# Patient Record
Sex: FEMALE | Race: WHITE | NOT HISPANIC OR LATINO | Employment: OTHER | URBAN - NONMETROPOLITAN AREA
[De-identification: names, ages, dates, MRNs, and addresses within clinical notes are randomized per-mention and may not be internally consistent; named-entity substitution may affect disease eponyms.]

---

## 2018-06-05 ENCOUNTER — OFFICE VISIT (OUTPATIENT)
Dept: GASTROENTEROLOGY | Facility: CLINIC | Age: 58
End: 2018-06-05

## 2018-06-05 VITALS
WEIGHT: 206.2 LBS | DIASTOLIC BLOOD PRESSURE: 53 MMHG | HEIGHT: 66 IN | BODY MASS INDEX: 33.14 KG/M2 | SYSTOLIC BLOOD PRESSURE: 102 MMHG | OXYGEN SATURATION: 91 % | HEART RATE: 66 BPM

## 2018-06-05 DIAGNOSIS — R11.0 NAUSEA: ICD-10-CM

## 2018-06-05 DIAGNOSIS — K63.5 POLYP OF COLON, UNSPECIFIED PART OF COLON, UNSPECIFIED TYPE: ICD-10-CM

## 2018-06-05 DIAGNOSIS — R19.7 DIARRHEA, UNSPECIFIED TYPE: ICD-10-CM

## 2018-06-05 DIAGNOSIS — R10.84 GENERALIZED ABDOMINAL PAIN: Primary | ICD-10-CM

## 2018-06-05 DIAGNOSIS — Z12.11 COLON CANCER SCREENING: ICD-10-CM

## 2018-06-05 PROCEDURE — 99244 OFF/OP CNSLTJ NEW/EST MOD 40: CPT | Performed by: PHYSICIAN ASSISTANT

## 2018-06-05 NOTE — PROGRESS NOTES
Chief Complaint   Patient presents with   • Diarrhea   • Abdominal Pain       Sloane Gustafson is a 58 y.o. female who presents to the office today for evaluation of Diarrhea and Abdominal Pain  .    HPI  58-year-old white female presents with long (several years) history of diarrhea.  Her diarrhea has gradually worsened.  She reports having up to 10 BMs daily.  Denies overt rectal bleeding.  She reports associated generalized abdominal bloating and cramping .  She takes OTC Imodium as needed, but she says that this medication is not very helpful.  She reports frequent nausea and takes Zofran as needed.  Her gallbladder has been removed.  She has history of colonic polyps.  Last colonoscopy was performed more than 5 years ago.  An aunt had colon cancer.        Review of Systems   Constitutional: Negative for appetite change and unexpected weight change.   HENT: Positive for congestion, sore throat and trouble swallowing.    Eyes: Positive for visual disturbance.   Respiratory: Positive for cough, shortness of breath and wheezing.    Cardiovascular: Positive for chest pain and leg swelling.   Gastrointestinal: Positive for abdominal distention, abdominal pain, diarrhea, nausea and rectal pain. Negative for anal bleeding, blood in stool, constipation and vomiting.   Endocrine: Positive for cold intolerance. Negative for heat intolerance.   Genitourinary: Negative.    Musculoskeletal: Positive for arthralgias, back pain and myalgias.   Skin: Negative.    Allergic/Immunologic: Positive for environmental allergies. Negative for food allergies.   Neurological: Positive for dizziness and light-headedness.   Hematological: Bruises/bleeds easily.   Psychiatric/Behavioral: Positive for sleep disturbance. The patient is nervous/anxious.        ACTIVE PROBLEMS:   Specialty Problems     None          PAST MEDICAL HISTORY:  Past Medical History:   Diagnosis Date   • Chronic kidney disease    • Diabetes mellitus    • Gout    • Heart  "disease    • History of uterine cancer    • Hypertension    • Vision impairment        SURGICAL HISTORY:  Past Surgical History:   Procedure Laterality Date   • ARTERIOVENOUS FISTULA     • CARPAL TUNNEL RELEASE     • CATARACT EXTRACTION     • CHOLECYSTECTOMY     • COLONOSCOPY     • EYE SURGERY     • HYSTERECTOMY         FAMILY HISTORY:  Family History   Problem Relation Age of Onset   • Diabetes Other    • Heart disease Other    • Cancer Other        SOCIAL HISTORY:  Social History   Substance Use Topics   • Smoking status: Never Smoker   • Smokeless tobacco: Never Used   • Alcohol use No       CURRENT MEDICATION:    Current Outpatient Prescriptions:   •  polyethylene glycol (GoLYTELY) 236 g solution, Starting at 6 pm on day prior to procedure, drink 8 ounces every 15 minutes until all gone or stools are clear. May add flavor packet., Disp: 4000 mL, Rfl: 0    ALLERGIES:  Patient has no known allergies.    VISIT VITALS:  /53   Pulse 66   Ht 167.6 cm (66\")   Wt 93.5 kg (206 lb 3.2 oz)   SpO2 91%   BMI 33.28 kg/m²   Physical Exam   Constitutional: She is oriented to person, place, and time. She appears well-developed and well-nourished.   HENT:   Head: Normocephalic and atraumatic.   Eyes: Conjunctivae and EOM are normal. Pupils are equal, round, and reactive to light.   Neck: Normal range of motion. Neck supple.   Cardiovascular: Normal rate, regular rhythm and normal heart sounds.    Pulmonary/Chest: Effort normal and breath sounds normal.   Abdominal: Soft. Bowel sounds are normal.   Musculoskeletal: Normal range of motion.   Neurological: She is alert and oriented to person, place, and time. She has normal reflexes.   Skin: Skin is warm and dry.   Psychiatric: She has a normal mood and affect. Her behavior is normal.   Nursing note and vitals reviewed.      Assessment/Plan      Diagnosis Plan   1. Generalized abdominal pain  Case Request   2. Nausea  Case Request   3. Diarrhea, unspecified type  Case " Request   4. Polyp of colon, unspecified part of colon, unspecified type  Case Request   5. Colon cancer screening  Case Request     REC  Of recommended that she undergo both the EGD and screening/surveillance colonoscopy for further evaluation.  Procedures, benefits, risks and alternatives were explained to the patient.  I recommended no change in her medical treatment today, pending completion of her workup.    Return if symptoms worsen or fail to improve.         Patient's Body mass index is 33.28 kg/m². BMI is above normal parameters. Recommendations include: educational material.  Weight reduction was advised.      Shubham Olson III, MD

## 2018-06-06 PROBLEM — R11.0 NAUSEA: Status: ACTIVE | Noted: 2018-06-06

## 2018-06-06 PROBLEM — R10.84 GENERALIZED ABDOMINAL PAIN: Status: ACTIVE | Noted: 2018-06-06

## 2018-06-06 PROBLEM — Z12.11 COLON CANCER SCREENING: Status: ACTIVE | Noted: 2018-06-06

## 2018-06-06 PROBLEM — K63.5 POLYP OF COLON: Status: ACTIVE | Noted: 2018-06-06

## 2018-06-06 PROBLEM — R19.7 DIARRHEA: Status: ACTIVE | Noted: 2018-06-06

## 2018-06-06 RX ORDER — CALCIUM ACETATE 667 MG/1
CAPSULE ORAL
COMMUNITY
Start: 2018-04-21 | End: 2018-08-30

## 2018-06-06 RX ORDER — METOLAZONE 5 MG/1
TABLET ORAL
COMMUNITY
Start: 2018-04-21

## 2018-06-06 RX ORDER — INSULIN GLARGINE 100 [IU]/ML
INJECTION, SOLUTION SUBCUTANEOUS
COMMUNITY

## 2018-06-06 RX ORDER — ERGOCALCIFEROL 1.25 MG/1
CAPSULE ORAL
COMMUNITY
Start: 2018-05-21

## 2018-06-06 RX ORDER — MONTELUKAST SODIUM 10 MG/1
TABLET ORAL
COMMUNITY
Start: 2018-03-12

## 2018-06-06 RX ORDER — FUROSEMIDE 40 MG/1
TABLET ORAL
COMMUNITY
Start: 2018-03-12

## 2018-06-06 RX ORDER — METOPROLOL SUCCINATE 25 MG/1
TABLET, EXTENDED RELEASE ORAL
COMMUNITY
Start: 2018-03-12

## 2018-06-06 RX ORDER — ISOSORBIDE MONONITRATE 30 MG/1
TABLET, EXTENDED RELEASE ORAL
COMMUNITY
Start: 2018-04-21

## 2018-06-06 RX ORDER — INSULIN ASPART 100 [IU]/ML
INJECTION, SOLUTION INTRAVENOUS; SUBCUTANEOUS
COMMUNITY
Start: 2018-05-30

## 2018-06-06 RX ORDER — ALLOPURINOL 100 MG/1
TABLET ORAL
COMMUNITY
Start: 2018-04-21

## 2018-06-06 RX ORDER — CALCITRIOL 0.25 UG/1
CAPSULE, LIQUID FILLED ORAL
COMMUNITY
Start: 2018-03-12

## 2018-06-06 RX ORDER — ASPIRIN 81 MG/1
81 TABLET ORAL DAILY
COMMUNITY

## 2018-06-06 RX ORDER — SERTRALINE HYDROCHLORIDE 100 MG/1
TABLET, FILM COATED ORAL
COMMUNITY
Start: 2018-03-12

## 2018-06-06 RX ORDER — NIFEDIPINE 30 MG/1
TABLET, EXTENDED RELEASE ORAL
COMMUNITY
Start: 2018-03-12

## 2018-06-06 RX ORDER — ROSUVASTATIN CALCIUM 20 MG/1
TABLET, COATED ORAL
COMMUNITY
Start: 2018-03-12

## 2018-06-06 RX ORDER — FENOFIBRATE 48 MG/1
TABLET, COATED ORAL
COMMUNITY
Start: 2018-04-21

## 2018-06-27 ENCOUNTER — PREP FOR SURGERY (OUTPATIENT)
Dept: OTHER | Facility: HOSPITAL | Age: 58
End: 2018-06-27

## 2018-07-03 ENCOUNTER — ANESTHESIA EVENT (OUTPATIENT)
Dept: PERIOP | Facility: HOSPITAL | Age: 58
End: 2018-07-03

## 2018-07-03 ENCOUNTER — ANESTHESIA (OUTPATIENT)
Dept: PERIOP | Facility: HOSPITAL | Age: 58
End: 2018-07-03

## 2018-07-03 ENCOUNTER — HOSPITAL ENCOUNTER (OUTPATIENT)
Facility: HOSPITAL | Age: 58
Setting detail: HOSPITAL OUTPATIENT SURGERY
Discharge: HOME | End: 2018-07-03
Attending: SURGERY | Admitting: NURSE ANESTHETIST, CERTIFIED REGISTERED

## 2018-07-03 VITALS
WEIGHT: 204 LBS | HEIGHT: 66 IN | TEMPERATURE: 97.6 F | RESPIRATION RATE: 20 BRPM | HEART RATE: 62 BPM | BODY MASS INDEX: 32.78 KG/M2 | DIASTOLIC BLOOD PRESSURE: 75 MMHG | OXYGEN SATURATION: 99 % | SYSTOLIC BLOOD PRESSURE: 168 MMHG

## 2018-07-03 DIAGNOSIS — K63.5 COLON POLYP: ICD-10-CM

## 2018-07-03 DIAGNOSIS — K29.70 GASTRITIS: ICD-10-CM

## 2018-07-03 LAB — GLUCOSE BLDC GLUCOMTR-MCNC: 88 MG/DL (ref 70–130)

## 2018-07-03 PROCEDURE — 82962 GLUCOSE BLOOD TEST: CPT

## 2018-07-03 PROCEDURE — 45385 COLONOSCOPY W/LESION REMOVAL: CPT | Performed by: SURGERY

## 2018-07-03 PROCEDURE — 25010000002 FENTANYL CITRATE (PF) 100 MCG/2ML SOLUTION: Performed by: NURSE ANESTHETIST, CERTIFIED REGISTERED

## 2018-07-03 PROCEDURE — 25010000002 PROPOFOL 10 MG/ML EMULSION: Performed by: NURSE ANESTHETIST, CERTIFIED REGISTERED

## 2018-07-03 PROCEDURE — S0260 H&P FOR SURGERY: HCPCS | Performed by: SURGERY

## 2018-07-03 PROCEDURE — 25010000002 PROPOFOL 1000 MG/ML EMULSION: Performed by: NURSE ANESTHETIST, CERTIFIED REGISTERED

## 2018-07-03 PROCEDURE — 43239 EGD BIOPSY SINGLE/MULTIPLE: CPT | Performed by: SURGERY

## 2018-07-03 RX ORDER — SODIUM CHLORIDE, SODIUM LACTATE, POTASSIUM CHLORIDE, CALCIUM CHLORIDE 600; 310; 30; 20 MG/100ML; MG/100ML; MG/100ML; MG/100ML
125 INJECTION, SOLUTION INTRAVENOUS CONTINUOUS
Status: DISCONTINUED | OUTPATIENT
Start: 2018-07-03 | End: 2018-07-03 | Stop reason: HOSPADM

## 2018-07-03 RX ORDER — SODIUM CHLORIDE 0.9 % (FLUSH) 0.9 %
1-10 SYRINGE (ML) INJECTION AS NEEDED
Status: DISCONTINUED | OUTPATIENT
Start: 2018-07-03 | End: 2018-07-03 | Stop reason: HOSPADM

## 2018-07-03 RX ORDER — IPRATROPIUM BROMIDE AND ALBUTEROL SULFATE 2.5; .5 MG/3ML; MG/3ML
3 SOLUTION RESPIRATORY (INHALATION) ONCE AS NEEDED
Status: DISCONTINUED | OUTPATIENT
Start: 2018-07-03 | End: 2018-08-30

## 2018-07-03 RX ORDER — ONDANSETRON 2 MG/ML
4 INJECTION INTRAMUSCULAR; INTRAVENOUS ONCE AS NEEDED
Status: DISCONTINUED | OUTPATIENT
Start: 2018-07-03 | End: 2018-08-30

## 2018-07-03 RX ORDER — FENTANYL CITRATE 50 UG/ML
INJECTION, SOLUTION INTRAMUSCULAR; INTRAVENOUS AS NEEDED
Status: DISCONTINUED | OUTPATIENT
Start: 2018-07-03 | End: 2018-07-03 | Stop reason: SURG

## 2018-07-03 RX ORDER — LIDOCAINE HYDROCHLORIDE 20 MG/ML
INJECTION, SOLUTION INFILTRATION; PERINEURAL AS NEEDED
Status: DISCONTINUED | OUTPATIENT
Start: 2018-07-03 | End: 2018-07-03 | Stop reason: SURG

## 2018-07-03 RX ORDER — FENTANYL CITRATE 50 UG/ML
50 INJECTION, SOLUTION INTRAMUSCULAR; INTRAVENOUS
Status: DISCONTINUED | OUTPATIENT
Start: 2018-07-03 | End: 2018-08-30

## 2018-07-03 RX ORDER — MEPERIDINE HYDROCHLORIDE 50 MG/ML
12.5 INJECTION INTRAMUSCULAR; INTRAVENOUS; SUBCUTANEOUS
Status: ACTIVE | OUTPATIENT
Start: 2018-07-03 | End: 2018-07-04

## 2018-07-03 RX ORDER — OXYCODONE HYDROCHLORIDE AND ACETAMINOPHEN 5; 325 MG/1; MG/1
1 TABLET ORAL ONCE AS NEEDED
Status: ACTIVE | OUTPATIENT
Start: 2018-07-03 | End: 2018-07-13

## 2018-07-03 RX ORDER — PROPOFOL 10 MG/ML
VIAL (ML) INTRAVENOUS AS NEEDED
Status: DISCONTINUED | OUTPATIENT
Start: 2018-07-03 | End: 2018-07-03 | Stop reason: SURG

## 2018-07-03 RX ORDER — PANTOPRAZOLE SODIUM 40 MG/1
40 TABLET, DELAYED RELEASE ORAL DAILY
Qty: 30 TABLET | Refills: 1 | Status: SHIPPED | OUTPATIENT
Start: 2018-07-03 | End: 2019-07-03

## 2018-07-03 RX ADMIN — PROPOFOL 150 MCG/KG/MIN: 10 INJECTION, EMULSION INTRAVENOUS at 08:05

## 2018-07-03 RX ADMIN — PROPOFOL 50 MG: 10 INJECTION, EMULSION INTRAVENOUS at 08:05

## 2018-07-03 RX ADMIN — SODIUM CHLORIDE, POTASSIUM CHLORIDE, SODIUM LACTATE AND CALCIUM CHLORIDE: 600; 310; 30; 20 INJECTION, SOLUTION INTRAVENOUS at 08:03

## 2018-07-03 RX ADMIN — FENTANYL CITRATE 100 MCG: 50 INJECTION INTRAMUSCULAR; INTRAVENOUS at 08:03

## 2018-07-03 RX ADMIN — LIDOCAINE HYDROCHLORIDE 100 MG: 20 INJECTION, SOLUTION INFILTRATION; PERINEURAL at 08:05

## 2018-07-03 NOTE — ANESTHESIA PREPROCEDURE EVALUATION
Anesthesia Evaluation     no history of anesthetic complications:  NPO Solid Status: > 8 hours  NPO Liquid Status: > 8 hours           Airway   Mallampati: II  TM distance: >3 FB  Neck ROM: full  No difficulty expected  Dental - normal exam     Pulmonary - normal exam   (+) COPD, asthma,   Cardiovascular - normal exam    (+) hypertension, CAD, CHF,       Neuro/Psych  (+) CVA,     GI/Hepatic/Renal/Endo    (+)   diabetes mellitus type 1,     Musculoskeletal     Abdominal  - normal exam   Substance History      OB/GYN          Other                      Anesthesia Plan    ASA 3     general     intravenous induction   Anesthetic plan and risks discussed with patient.

## 2018-07-03 NOTE — OP NOTE
ESOPHAGOGASTRODUODENOSCOPY  Procedure Note    Sloane Gustafson  7/3/2018    Pre-op Diagnosis: Diarrhea, abdominal pain    Post-op Diagnosis:   Same, gastritis, colon polyp, diverticulosis    Indications: see above    Procedure(s):  ESOPHAGOGASTRODUODENOSCOPY W/ BIOPSY  COLONOSCOPY WITH POLYPECTOMY    Surgeon(s):  MD Aiden Msihra MD    Anesthesia: Choice    Staff:   Circulator: Eulalia Briseno RN  Endo Technician: Renny Emerson    Findings: Mild gastritis, mild diverticulosis, transverse colon polyp    Operative Procedure: The patient was taken operating suite and placed in left lateral decubitus position.  Bilateral sequential compression devices were in place and IV anesthesia was administered.  Timeout procedure was performed.  The endoscope was inserted into the oropharynx and the esophagus was intubated.  The scope was advanced to the third portion of the duodenum.  The scope was slowly withdrawn evaluating all mucosal areas.  Mild gastritis with biopsies of the antrum obtained.  Retroflexion was performed and there is no evidence of hiatal hernia.  The GE junction was within normal limits..  The remainder of the esophageal mucosa was within normal limits and the scope was removed.  The colonoscopy was then performed.  Digital rectal examination was within normal limits.  The colonoscope was inserted and advanced to the cecum as evidenced by the appendiceal orifice and ileocecal valve.  Scope was slowly withdrawn evaluating the colonic mucosa.  There was a single pedunculated 3-4 mm transverse colon polyp that was removed with cold snare polypectomy.  The polypectomy site was hemostatic and the scope was withdrawn and the remainder of the colonic mucosa was within normal limits with the exception of some mild sigmoid diverticulosis without diverticulitis.  The scope was withdrawn and the patient was awakened from anesthesia and taken to recovery.      Estimated Blood Loss:  minimal    Specimens:   Antral biopsy, transverse colon polyp                 Drains: none    Grafts or Implants: none    Complications: none    Recommendations: Screening colonoscopy in 10 years pending pathology, continue PPI therapy    Aiden Royal MD     Date: 7/3/2018  Time: 8:40 AM

## 2018-07-03 NOTE — ANESTHESIA POSTPROCEDURE EVALUATION
Patient: Sloane Gustafson    Procedure Summary     Date:  07/03/18 Room / Location:  [facility] OR [address] [place] [address] COR OR    Anesthesia Start:  0803 Anesthesia Stop:  0836    Procedures:       ESOPHAGOGASTRODUODENOSCOPY W/ BIOPSY (N/A Esophagus)      COLONOSCOPY (N/A ) Diagnosis:  (R10.84)    Surgeon:  Aiden Royal MD Provider:  Joe Molina MD    Anesthesia Type:  general ASA Status:  3          Anesthesia Type: general  Last vitals  BP   (!) 86/36 (07/03/18 0835)   Temp   97.6 °F (36.4 °C) (07/03/18 0835)   Pulse   56 (07/03/18 0835)   Resp   16 (07/03/18 0835)     SpO2   97 % (07/03/18 0835)     Post Anesthesia Care and Evaluation    Patient location during evaluation: PHASE II  Patient participation: complete - patient participated  Level of consciousness: awake and alert  Pain score: 0  Pain management: adequate  Airway patency: patent  Anesthetic complications: No anesthetic complications    Cardiovascular status: acceptable  Respiratory status: acceptable  Hydration status: acceptable

## 2018-07-03 NOTE — H&P
HPI  58-year-old white female presents with long (several years) history of diarrhea.  Her diarrhea has gradually worsened.  She reports having up to 10 BMs daily.  Denies overt rectal bleeding.  She reports associated generalized abdominal bloating and cramping .  She takes OTC Imodium as needed, but she says that this medication is not very helpful.  She reports frequent nausea and takes Zofran as needed.  Her gallbladder has been removed.  She has history of colonic polyps.  Last colonoscopy was performed more than 5 years ago.  An aunt had colon cancer.           Review of Systems   Constitutional: Negative for appetite change and unexpected weight change.   HENT: Positive for congestion, sore throat and trouble swallowing.    Eyes: Positive for visual disturbance.   Respiratory: Positive for cough, shortness of breath and wheezing.    Cardiovascular: Positive for chest pain and leg swelling.   Gastrointestinal: Positive for abdominal distention, abdominal pain, diarrhea, nausea and rectal pain. Negative for anal bleeding, blood in stool, constipation and vomiting.   Endocrine: Positive for cold intolerance. Negative for heat intolerance.   Genitourinary: Negative.    Musculoskeletal: Positive for arthralgias, back pain and myalgias.   Skin: Negative.    Allergic/Immunologic: Positive for environmental allergies. Negative for food allergies.   Neurological: Positive for dizziness and light-headedness.   Hematological: Bruises/bleeds easily.   Psychiatric/Behavioral: Positive for sleep disturbance. The patient is nervous/anxious.          ACTIVE PROBLEMS:       Specialty Problems      None             PAST MEDICAL HISTORY:  Medical History        Past Medical History:   Diagnosis Date   • Chronic kidney disease     • Diabetes mellitus     • Gout     • Heart disease     • History of uterine cancer     • Hypertension     • Vision impairment              SURGICAL HISTORY:  Surgical History         Past Surgical  "History:   Procedure Laterality Date   • ARTERIOVENOUS FISTULA       • CARPAL TUNNEL RELEASE       • CATARACT EXTRACTION       • CHOLECYSTECTOMY       • COLONOSCOPY       • EYE SURGERY       • HYSTERECTOMY                FAMILY HISTORY:  Family History   Problem Relation Age of Onset   • Diabetes Other     • Heart disease Other     • Cancer Other           SOCIAL HISTORY:       Social History   Substance Use Topics   • Smoking status: Never Smoker   • Smokeless tobacco: Never Used   • Alcohol use No         CURRENT MEDICATION:     Current Outpatient Prescriptions:   •  polyethylene glycol (GoLYTELY) 236 g solution, Starting at 6 pm on day prior to procedure, drink 8 ounces every 15 minutes until all gone or stools are clear. May add flavor packet., Disp: 4000 mL, Rfl: 0     ALLERGIES:  Patient has no known allergies.     VISIT VITALS:  /53   Pulse 66   Ht 167.6 cm (66\")   Wt 93.5 kg (206 lb 3.2 oz)   SpO2 91%   BMI 33.28 kg/m²   Physical Exam   Constitutional: She is oriented to person, place, and time. She appears well-developed and well-nourished.   HENT:   Head: Normocephalic and atraumatic.   Eyes: Conjunctivae and EOM are normal. Pupils are equal, round, and reactive to light.   Neck: Normal range of motion. Neck supple.   Cardiovascular: Normal rate, regular rhythm and normal heart sounds.    Pulmonary/Chest: Effort normal and breath sounds normal.   Abdominal: Soft. Bowel sounds are normal.   Musculoskeletal: Normal range of motion.   Neurological: She is alert and oriented to person, place, and time. She has normal reflexes.   Skin: Skin is warm and dry.   Psychiatric: She has a normal mood and affect. Her behavior is normal.   Nursing note and vitals reviewed.           Assessment/Plan           Diagnosis Plan   1. Generalized abdominal pain  Case Request   2. Nausea  Case Request   3. Diarrhea, unspecified type  Case Request   4. Polyp of colon, unspecified part of colon, unspecified type  Case " Request   5. Colon cancer screening  Case Request      REC  Of recommended that she undergo both the EGD and screening/surveillance colonoscopy for further evaluation.  Procedures, benefits, risks and alternatives were explained to the patient.  I recommended no change in her medical treatment today, pending completion of her workup.     Return if symptoms worsen or fail to improve.           Patient's Body mass index is 33.28 kg/m². BMI is above normal parameters. Recommendations include: educational material.  Weight reduction was advised.

## 2018-07-24 ENCOUNTER — OFFICE VISIT (OUTPATIENT)
Dept: SURGERY | Facility: CLINIC | Age: 58
End: 2018-07-24

## 2018-07-24 VITALS
HEIGHT: 66 IN | WEIGHT: 204 LBS | BODY MASS INDEX: 32.78 KG/M2 | HEART RATE: 66 BPM | SYSTOLIC BLOOD PRESSURE: 125 MMHG | DIASTOLIC BLOOD PRESSURE: 75 MMHG

## 2018-07-24 DIAGNOSIS — D12.3 ADENOMATOUS POLYP OF TRANSVERSE COLON: Primary | ICD-10-CM

## 2018-07-24 PROCEDURE — 99212 OFFICE O/P EST SF 10 MIN: CPT | Performed by: SURGERY

## 2018-07-24 NOTE — PROGRESS NOTES
Subjective   Sloane Gustafson is a 58 y.o. female  is here today for follow-up.         Sloane Gustafson is a 58 y.o. female here for follow up after colonoscopy where no source of diarrhea was identified.  Benign polyps were noted.  The patient's diarrhea persist but is managed with antidiarrheal agents.            Sloane was seen today for s/p egd & colonoscopy.    Diagnoses and all orders for this visit:    Adenomatous polyp of transverse colon        Assessment     Sloane Gustafson is a 58 y.o. female with chronic diarrhea of unknown etiology.  Continue antidiarrheal medications as needed.  Repeat screening colonoscopy in 10 years

## 2018-08-30 ENCOUNTER — OFFICE VISIT (OUTPATIENT)
Dept: GASTROENTEROLOGY | Facility: CLINIC | Age: 58
End: 2018-08-30

## 2018-08-30 ENCOUNTER — HOSPITAL ENCOUNTER (OUTPATIENT)
Dept: GENERAL RADIOLOGY | Facility: HOSPITAL | Age: 58
Discharge: HOME | End: 2018-08-30
Admitting: PHYSICIAN ASSISTANT

## 2018-08-30 ENCOUNTER — LAB (OUTPATIENT)
Dept: LAB | Facility: HOSPITAL | Age: 58
End: 2018-08-30

## 2018-08-30 VITALS
SYSTOLIC BLOOD PRESSURE: 124 MMHG | OXYGEN SATURATION: 94 % | HEIGHT: 66 IN | BODY MASS INDEX: 33.27 KG/M2 | DIASTOLIC BLOOD PRESSURE: 62 MMHG | WEIGHT: 207 LBS | HEART RATE: 70 BPM

## 2018-08-30 DIAGNOSIS — R11.0 NAUSEA: ICD-10-CM

## 2018-08-30 DIAGNOSIS — R19.7 DIARRHEA, UNSPECIFIED TYPE: Primary | ICD-10-CM

## 2018-08-30 DIAGNOSIS — R19.7 DIARRHEA, UNSPECIFIED TYPE: ICD-10-CM

## 2018-08-30 DIAGNOSIS — R10.30 LOWER ABDOMINAL PAIN: ICD-10-CM

## 2018-08-30 DIAGNOSIS — R19.8 ABNORMAL ABDOMINAL EXAM: ICD-10-CM

## 2018-08-30 LAB
ALBUMIN SERPL-MCNC: 3.6 G/DL (ref 3.4–4.8)
ALBUMIN/GLOB SERPL: 1 G/DL (ref 1.5–2.5)
ALP SERPL-CCNC: 98 U/L (ref 35–104)
ALT SERPL W P-5'-P-CCNC: 24 U/L (ref 10–36)
AMYLASE SERPL-CCNC: 101 U/L (ref 28–100)
ANION GAP SERPL CALCULATED.3IONS-SCNC: 7.4 MMOL/L (ref 3.6–11.2)
AST SERPL-CCNC: 35 U/L (ref 10–30)
BASOPHILS # BLD AUTO: 0.03 10*3/MM3 (ref 0–0.3)
BASOPHILS NFR BLD AUTO: 0.6 % (ref 0–2)
BILIRUB SERPL-MCNC: 0.7 MG/DL (ref 0.2–1.8)
BUN BLD-MCNC: 38 MG/DL (ref 7–21)
BUN/CREAT SERPL: 10 (ref 7–25)
CALCIUM SPEC-SCNC: 9.1 MG/DL (ref 7.7–10)
CHLORIDE SERPL-SCNC: 102 MMOL/L (ref 99–112)
CO2 SERPL-SCNC: 28.6 MMOL/L (ref 24.3–31.9)
CREAT BLD-MCNC: 3.8 MG/DL (ref 0.43–1.29)
CRP SERPL-MCNC: 1.12 MG/DL (ref 0–0.99)
DEPRECATED RDW RBC AUTO: 47.3 FL (ref 37–54)
EOSINOPHIL # BLD AUTO: 0.31 10*3/MM3 (ref 0–0.7)
EOSINOPHIL NFR BLD AUTO: 6.6 % (ref 0–5)
ERYTHROCYTE [DISTWIDTH] IN BLOOD BY AUTOMATED COUNT: 14 % (ref 11.5–14.5)
GFR SERPL CREATININE-BSD FRML MDRD: 12 ML/MIN/1.73
GLOBULIN UR ELPH-MCNC: 3.7 GM/DL
GLUCOSE BLD-MCNC: 182 MG/DL (ref 70–110)
HCT VFR BLD AUTO: 34.7 % (ref 37–47)
HGB BLD-MCNC: 11.1 G/DL (ref 12–16)
IMM GRANULOCYTES # BLD: 0.01 10*3/MM3 (ref 0–0.03)
IMM GRANULOCYTES NFR BLD: 0.2 % (ref 0–0.5)
LIPASE SERPL-CCNC: 72 U/L (ref 13–60)
LYMPHOCYTES # BLD AUTO: 1.27 10*3/MM3 (ref 1–3)
LYMPHOCYTES NFR BLD AUTO: 26.8 % (ref 21–51)
MCH RBC QN AUTO: 31.1 PG (ref 27–33)
MCHC RBC AUTO-ENTMCNC: 32 G/DL (ref 33–37)
MCV RBC AUTO: 97.2 FL (ref 80–94)
MONOCYTES # BLD AUTO: 0.37 10*3/MM3 (ref 0.1–0.9)
MONOCYTES NFR BLD AUTO: 7.8 % (ref 0–10)
NEUTROPHILS # BLD AUTO: 2.74 10*3/MM3 (ref 1.4–6.5)
NEUTROPHILS NFR BLD AUTO: 58 % (ref 30–70)
OSMOLALITY SERPL CALC.SUM OF ELEC: 289.4 MOSM/KG (ref 273–305)
PLATELET # BLD AUTO: 99 10*3/MM3 (ref 130–400)
PMV BLD AUTO: 11.8 FL (ref 6–10)
POTASSIUM BLD-SCNC: 4 MMOL/L (ref 3.5–5.3)
PROT SERPL-MCNC: 7.3 G/DL (ref 6–8)
RBC # BLD AUTO: 3.57 10*6/MM3 (ref 4.2–5.4)
SODIUM BLD-SCNC: 138 MMOL/L (ref 135–153)
WBC NRBC COR # BLD: 4.73 10*3/MM3 (ref 4.5–12.5)

## 2018-08-30 PROCEDURE — 99214 OFFICE O/P EST MOD 30 MIN: CPT | Performed by: PHYSICIAN ASSISTANT

## 2018-08-30 PROCEDURE — 85025 COMPLETE CBC W/AUTO DIFF WBC: CPT

## 2018-08-30 PROCEDURE — 36415 COLL VENOUS BLD VENIPUNCTURE: CPT

## 2018-08-30 PROCEDURE — 82150 ASSAY OF AMYLASE: CPT

## 2018-08-30 PROCEDURE — 86140 C-REACTIVE PROTEIN: CPT

## 2018-08-30 PROCEDURE — 80053 COMPREHEN METABOLIC PANEL: CPT

## 2018-08-30 PROCEDURE — 83690 ASSAY OF LIPASE: CPT

## 2018-08-30 PROCEDURE — 74019 RADEX ABDOMEN 2 VIEWS: CPT | Performed by: RADIOLOGY

## 2018-08-30 PROCEDURE — 74019 RADEX ABDOMEN 2 VIEWS: CPT

## 2018-08-30 RX ORDER — ONDANSETRON HYDROCHLORIDE 8 MG/1
TABLET, FILM COATED ORAL EVERY 12 HOURS PRN
COMMUNITY
End: 2018-09-20 | Stop reason: SDUPTHER

## 2018-08-30 RX ORDER — FLUTICASONE PROPIONATE 50 MCG
2 SPRAY, SUSPENSION (ML) NASAL DAILY
COMMUNITY

## 2018-08-30 RX ORDER — LOPERAMIDE HYDROCHLORIDE 2 MG/1
2 CAPSULE ORAL 4 TIMES DAILY PRN
COMMUNITY
End: 2018-10-25

## 2018-09-04 ENCOUNTER — TELEPHONE (OUTPATIENT)
Dept: GASTROENTEROLOGY | Facility: CLINIC | Age: 58
End: 2018-09-04

## 2018-09-04 NOTE — TELEPHONE ENCOUNTER
Please ck with patient about stool studies, she was planning on taking them to her local hospital ([place]).

## 2018-09-04 NOTE — TELEPHONE ENCOUNTER
Juliana, I spoke with patient and she stated that she is not going ton have her stool test in [place]. She is scheduled to have her Ultrasound here on 9-11-18 and she will bring her stool here to be tested.

## 2018-09-11 ENCOUNTER — HOSPITAL ENCOUNTER (OUTPATIENT)
Dept: ULTRASOUND IMAGING | Facility: HOSPITAL | Age: 58
Discharge: HOME | End: 2018-09-11
Admitting: PHYSICIAN ASSISTANT

## 2018-09-11 DIAGNOSIS — R19.8 ABNORMAL ABDOMINAL EXAM: ICD-10-CM

## 2018-09-11 DIAGNOSIS — R10.30 LOWER ABDOMINAL PAIN: ICD-10-CM

## 2018-09-11 DIAGNOSIS — R19.7 DIARRHEA, UNSPECIFIED TYPE: ICD-10-CM

## 2018-09-11 DIAGNOSIS — R11.0 NAUSEA: ICD-10-CM

## 2018-09-11 PROCEDURE — 76705 ECHO EXAM OF ABDOMEN: CPT | Performed by: RADIOLOGY

## 2018-09-11 PROCEDURE — 76700 US EXAM ABDOM COMPLETE: CPT

## 2018-09-20 ENCOUNTER — OFFICE VISIT (OUTPATIENT)
Dept: GASTROENTEROLOGY | Facility: CLINIC | Age: 58
End: 2018-09-20

## 2018-09-20 ENCOUNTER — LAB (OUTPATIENT)
Dept: LAB | Facility: HOSPITAL | Age: 58
End: 2018-09-20

## 2018-09-20 VITALS
SYSTOLIC BLOOD PRESSURE: 122 MMHG | HEIGHT: 66 IN | OXYGEN SATURATION: 98 % | HEART RATE: 67 BPM | BODY MASS INDEX: 33.14 KG/M2 | DIASTOLIC BLOOD PRESSURE: 65 MMHG | WEIGHT: 206.2 LBS

## 2018-09-20 DIAGNOSIS — D53.9 ANEMIA, MACROCYTIC: ICD-10-CM

## 2018-09-20 DIAGNOSIS — K76.0 NAFLD (NONALCOHOLIC FATTY LIVER DISEASE): ICD-10-CM

## 2018-09-20 DIAGNOSIS — R19.7 DIARRHEA, UNSPECIFIED TYPE: Primary | ICD-10-CM

## 2018-09-20 DIAGNOSIS — R16.1 SPLENOMEGALY: ICD-10-CM

## 2018-09-20 DIAGNOSIS — R11.0 NAUSEA: ICD-10-CM

## 2018-09-20 LAB
CRP SERPL-MCNC: 0.9 MG/DL (ref 0–0.99)
DEPRECATED RDW RBC AUTO: 55.3 FL (ref 37–54)
ERYTHROCYTE [DISTWIDTH] IN BLOOD BY AUTOMATED COUNT: 15.5 % (ref 11.5–14.5)
FOLATE SERPL-MCNC: 11.12 NG/ML (ref 5.4–20)
HCT VFR BLD AUTO: 35.9 % (ref 37–47)
HGB BLD-MCNC: 11.2 G/DL (ref 12–16)
MCH RBC QN AUTO: 30.6 PG (ref 27–33)
MCHC RBC AUTO-ENTMCNC: 31.2 G/DL (ref 33–37)
MCV RBC AUTO: 98.1 FL (ref 80–94)
PLATELET # BLD AUTO: 101 10*3/MM3 (ref 130–400)
PMV BLD AUTO: 11.3 FL (ref 6–10)
RBC # BLD AUTO: 3.66 10*6/MM3 (ref 4.2–5.4)
VIT B12 BLD-MCNC: 386 PG/ML (ref 211–911)
WBC NRBC COR # BLD: 3.17 10*3/MM3 (ref 4.5–12.5)

## 2018-09-20 PROCEDURE — 85060 BLOOD SMEAR INTERPRETATION: CPT

## 2018-09-20 PROCEDURE — 99214 OFFICE O/P EST MOD 30 MIN: CPT | Performed by: PHYSICIAN ASSISTANT

## 2018-09-20 PROCEDURE — 86140 C-REACTIVE PROTEIN: CPT

## 2018-09-20 PROCEDURE — 85027 COMPLETE CBC AUTOMATED: CPT

## 2018-09-20 PROCEDURE — 36415 COLL VENOUS BLD VENIPUNCTURE: CPT

## 2018-09-20 PROCEDURE — 82607 VITAMIN B-12: CPT

## 2018-09-20 PROCEDURE — 82746 ASSAY OF FOLIC ACID SERUM: CPT

## 2018-09-20 RX ORDER — ONDANSETRON HYDROCHLORIDE 8 MG/1
8 TABLET, FILM COATED ORAL EVERY 8 HOURS PRN
Qty: 42 TABLET | Refills: 3 | Status: SHIPPED | OUTPATIENT
Start: 2018-09-20

## 2018-09-20 NOTE — PROGRESS NOTES
: 1960    Chief Complaint   Patient presents with   • Diarrhea       Sloane Gustafson is a 58 y.o. female who presents to the office today as a follow up appointment regarding Diarrhea.    History of Present Illness:  She states that she has been having very severe nausea that is improved with Zofran but she has ran out of this medication now. She reports that symptoms of diarrhea are still the same. She wears briefs during her dialysis 3 times per week because she is fearful of fecal incontinence. She has loose stools up to 10 times per day every day. She does admit to taking imodium, 2 tablets each time up to 8 tablets per day. She has been taking this medication for years. She had a cholecystectomy in  and it seems that her diarrhea has been worse since then. Her maternal aunt had colon cancer. There is no known first degree family history of colon cancer. Mother had colon polyps (she thinks). Father had colon resection related to diverticulitis. Cholesterol has been elevated in the past but reports that when it was last checked, it was reported as normal. She monitors her glucose daily.      On 7/3/18, she had EGD and colonoscopy performed by Dr. Royal which revealed: Mild gastritis, mild sigmoid diverticulosis, single pedunculated 3-4 mm transverse colon polyp. Pathology showed mild chronic gastritis and colon polyps was a tubular adenoma. No random colon biopsies were taken.     Labs 2018:  WBC 4.50 - 12.50 10*3/mm3 4.73    RBC 4.20 - 5.40 10*6/mm3 3.57     Hemoglobin 12.0 - 16.0 g/dL 11.1     Hematocrit 37.0 - 47.0 % 34.7     MCV 80.0 - 94.0 fL 97.2     MCH 27.0 - 33.0 pg 31.1    MCHC 33.0 - 37.0 g/dL 32.0     RDW 11.5 - 14.5 % 14.0    RDW-SD 37.0 - 54.0 fl 47.3    MPV 6.0 - 10.0 fL 11.8     Platelets 130 - 400 10*3/mm3 99       Eosinophil % 0.0 - 5.0 % 6.6       Glucose 70 - 110 mg/dL 182     BUN 7 - 21 mg/dL 38     Creatinine 0.43 - 1.29 mg/dL 3.80     Sodium 135 - 153 mmol/L 138    Potassium  3.5 - 5.3 mmol/L 4.0    Chloride 99 - 112 mmol/L 102    CO2 24.3 - 31.9 mmol/L 28.6    Calcium 7.7 - 10.0 mg/dL 9.1    Total Protein 6.0 - 8.0 g/dL 7.3    Albumin 3.40 - 4.80 g/dL 3.60    ALT (SGPT) 10 - 36 U/L 24    AST (SGOT) 10 - 30 U/L 35     Alkaline Phosphatase 35 - 104 U/L 98    Comment: Note New Reference Ranges   Total Bilirubin 0.2 - 1.8 mg/dL 0.7    eGFR Non African Amer >60 mL/min/1.73 12     Globulin gm/dL 3.7    A/G Ratio 1.5 - 2.5 g/dL 1.0     BUN/Creatinine Ratio 7.0 - 25.0 10.0    Anion Gap 3.6 - 11.2 mmol/L 7.4      Amylase 101 mildly elev  Lipase 72 mildly elev  CRP 1.12 elev    Stool tests performed at [place] 9/2018:  Culture normal  C diff negative  Fecal calprotectin normal  Pancreatic elastase normal  O&P not resulted    Abdominal film 8/30/2018 normal    9/11/2018 Us abd: (ordered due to noted firmness during abdominal exam last visit)  FINDINGS: Multiple real-time images were obtained. The pancreas was  fairly well demonstrated sonographically. There were no masses or  inflammatory changes. There is color Doppler flow in the splenic and  portal vein. The abdominal aorta was normal in caliber. The liver shows  diffuse increased echogenicity. No focal lesions were identified in the  liver. There is color Doppler flow in the hepatic veins and inferior  vena cava. Common hepatic portion bile duct was normal measuring 2.7 mm.  There is no ascites. The kidneys showed no evidence of obstruction. The  spleen was slightly enlarged measuring 15.2 cm in cephalocaudad  direction and 7.7 cm in thickness at the splenic hilum. The estimated  splenic volume was approximately 494 mL.  IMPRESSION:  Fatty changes in the liver with mild splenomegaly.    Review of Systems   Constitutional: Positive for fatigue.   HENT: Positive for trouble swallowing.    Eyes: Positive for visual disturbance.   Respiratory: Positive for shortness of breath.    Cardiovascular: Positive for chest pain.   Gastrointestinal: Positive  for abdominal distention, abdominal pain, constipation, diarrhea and nausea. Negative for anal bleeding, blood in stool, rectal pain and vomiting.   Endocrine: Positive for cold intolerance. Negative for heat intolerance.   Genitourinary: Negative.    Musculoskeletal: Positive for arthralgias, back pain and myalgias.   Skin: Negative.    Allergic/Immunologic: Positive for environmental allergies. Negative for food allergies.   Neurological: Positive for dizziness and light-headedness.   Hematological: Bruises/bleeds easily.   Psychiatric/Behavioral: The patient is nervous/anxious.        Past Medical History:   Diagnosis Date   • Anemia 2012   • Arthritis    • Asthma    • CHF (congestive heart failure) (CMS/HCC)    • Cholelithiasis 2008 removed   • Chronic kidney disease    • Colon polyp    • COPD (chronic obstructive pulmonary disease) (CMS/HCC)    • Coronary artery disease    • Diabetes mellitus (CMS/HCC)    • Diverticulitis of colon 7/2018   • Fatty liver    • Gout    • Heart disease    • History of uterine cancer    • Hypertension    • Irritable bowel syndrome 2012 ?   • Lactose intolerance    • Stroke (CMS/HCC)    • Vision impairment        Past Surgical History:   Procedure Laterality Date   • ABDOMINAL SURGERY  6/2008   • ARTERIOVENOUS FISTULA     • CARPAL TUNNEL RELEASE     • CATARACT EXTRACTION     • CHOLECYSTECTOMY     • COLONOSCOPY     • COLONOSCOPY N/A 7/3/2018    Procedure: COLONOSCOPY;  Surgeon: Aiden Royal MD;  Location: [facility] OR;  Service: Gastroenterology   • ENDOSCOPY N/A 7/3/2018    Procedure: ESOPHAGOGASTRODUODENOSCOPY W/ BIOPSY;  Surgeon: Aiden Ryoal MD;  Location: [facility];  Service: Gastroenterology   • EYE SURGERY     • HYSTERECTOMY     • UPPER GASTROINTESTINAL ENDOSCOPY  7/3/2018       Family History   Problem Relation Age of Onset   • Diabetes Other    • Heart disease Other    • Cancer Other    • Colon cancer Maternal Aunt    • Colon polyps Sister        Social History      Social History   • Marital status: Single     Social History Main Topics   • Smoking status: Never Smoker   • Smokeless tobacco: Never Used   • Alcohol use No   • Drug use: No   • Sexual activity: No     Other Topics Concern   • Not on file       Current Outpatient Prescriptions:   •  albuterol (PROAIR RESPICLICK) 108 (90 Base) MCG/ACT inhaler, Inhale Every 4 (Four) Hours As Needed for Wheezing., Disp: , Rfl:   •  allopurinol (ZYLOPRIM) 100 MG tablet, , Disp: , Rfl:   •  aspirin 81 MG EC tablet, Take 81 mg by mouth Daily., Disp: , Rfl:   •  calcitriol (ROCALTROL) 0.25 MCG capsule, , Disp: , Rfl:   •  Cetirizine HCl (ZYRTEC ALLERGY) 10 MG capsule, Take  by mouth., Disp: , Rfl:   •  fenofibrate (TRICOR) 48 MG tablet, , Disp: , Rfl:   •  Ferric Citrate (AURYXIA PO), Take  by mouth., Disp: , Rfl:   •  fluticasone (FLONASE) 50 MCG/ACT nasal spray, 2 sprays into the nostril(s) as directed by provider Daily., Disp: , Rfl:   •  furosemide (LASIX) 40 MG tablet, , Disp: , Rfl:   •  Insulin Glargine (BASAGLAR KWIKPEN) 100 UNIT/ML injection pen, Inject  under the skin., Disp: , Rfl:   •  isosorbide mononitrate (IMDUR) 30 MG 24 hr tablet, , Disp: , Rfl:   •  loperamide (IMODIUM) 2 MG capsule, Take 2 mg by mouth 4 (Four) Times a Day As Needed for Diarrhea., Disp: , Rfl:   •  metOLazone (ZAROXOLYN) 5 MG tablet, , Disp: , Rfl:   •  metoprolol succinate XL (TOPROL-XL) 25 MG 24 hr tablet, , Disp: , Rfl:   •  montelukast (SINGULAIR) 10 MG tablet, , Disp: , Rfl:   •  NIFEdipine XL (PROCARDIA XL) 30 MG 24 hr tablet, , Disp: , Rfl:   •  NOVOLOG FLEXPEN 100 UNIT/ML solution pen-injector sc pen, , Disp: , Rfl:   •  ondansetron (ZOFRAN) 8 MG tablet, Take  by mouth Every 12 (Twelve) Hours As Needed for Nausea or Vomiting., Disp: , Rfl:   •  pantoprazole (PROTONIX) 40 MG EC tablet, Take 1 tablet by mouth Daily., Disp: 30 tablet, Rfl: 1  •  rosuvastatin (CRESTOR) 20 MG tablet, , Disp: , Rfl:   •  sertraline (ZOLOFT) 100 MG tablet, , Disp:  ", Rfl:   •  vitamin D (ERGOCALCIFEROL) 72476 units capsule capsule, , Disp: , Rfl:     Allergies:   Patient has no known allergies.    Vitals:  /65 (BP Location: Right arm, Patient Position: Sitting, Cuff Size: Adult)   Pulse 67   Ht 167.6 cm (66\")   Wt 93.5 kg (206 lb 3.2 oz)   SpO2 98%   BMI 33.28 kg/m²     Physical Exam   Constitutional: She is oriented to person, place, and time. She appears well-developed and well-nourished. No distress.   HENT:   Head: Normocephalic and atraumatic.   Nose: Nose normal.   Mouth/Throat: Oropharynx is clear and moist.   Eyes: Conjunctivae are normal. Right eye exhibits no discharge. Left eye exhibits no discharge. No scleral icterus.   Neck: Normal range of motion. No JVD present.   Cardiovascular: Normal rate, regular rhythm and normal heart sounds.  Exam reveals no gallop and no friction rub.    No murmur heard.  Pulmonary/Chest: Effort normal and breath sounds normal. No respiratory distress. She has no wheezes. She has no rales. She exhibits no tenderness.   Abdominal: Soft. Bowel sounds are normal. She exhibits no mass. There is hepatosplenomegaly. There is tenderness (generalized).   Musculoskeletal: Normal range of motion. She exhibits no edema or deformity.   Fistula noted in proximal left upper extremity with ecchymosis   Neurological: She is alert and oriented to person, place, and time. Coordination normal.   Skin: Skin is warm and dry. No rash noted. She is not diaphoretic. No erythema.   Psychiatric: She has a normal mood and affect. Her behavior is normal. Judgment and thought content normal.   Vitals reviewed.      Assessment/Plan:  1. Diarrhea, unspecified type    2. Anemia, macrocytic    3. Nausea    4. NAFLD (nonalcoholic fatty liver disease)    5. Splenomegaly      Orders Placed This Encounter   Procedures   • CBC (No Diff)   • Folate   • Vitamin B12   • C-reactive Protein     New Medications Ordered This Visit   Medications   • Pancrelipase, " Lip-Prot-Amyl, (CREON) 47637 units capsule delayed-release particles     Sig: Take 2 caps PO with meals and 1 PO with snacks.     Dispense:  240 capsule     Refill:  5   • ondansetron (ZOFRAN) 8 MG tablet     Sig: Take 1 tablet by mouth Every 8 (Eight) Hours As Needed for Nausea or Vomiting.     Dispense:  42 tablet     Refill:  3     Diarrhea is persistent and is of uncertain etiology at this time. We still have not ruled out parasitic infections which is definitely possible with increased eosinophil percentage. I have ordered more labs for evaluation of her anemia. MCV is elevated and I will check B12 abd folate. We will re-check CRP due to past elevation and monitor her CBC. I have asked her to start taking Creon as directed for treatment of diarrhea for now. She will monitor glucose carefully when starting this medication. We may be able to increase to 3-4 capsules with meals if it seems to be helping at next visit.     I noticed elevated AST and fatty liver noted on US abdomen. She was advised of the importance of weight loss due to finding of fatty liver infiltration. Body mass index is 33 and her goal would be a BMI of 25 or less. She was advised to lose 10% of her body weight over the next 6 months. This is to be accomplished with a healthy diet (hypocaloric diet) and exercise. Replace fat with complex carbs and work on increasing fiber in the diet with more whole grains, fruits and vegetables. She was informed of the importance of good control of her glucose and cholesterol to help decrease her fatty liver infiltrate. Avoid all alcohol intake. She was advised that fatty infiltrate of the liver is not a benign condition and can lead to serious liver disease, even cirrhosis, possible liver transplant and hepatocellular carcinoma. She will have ultrasound of the liver at least annually and hepatic panel every 6 months for monitoring. Note splenomegaly and thrombocytopenia can be a result of liver disease but  may also consider another problem with her many co-morbidities. She has never seen hematologist.    For nausea, she will continue taking Zofran as needed and refills have been given.            Return in about 1 month (around 10/20/2018) for recheck diarrhea.      Electronically signed 9/26/2018 3:18 PM  Juliana Leone PA-C, [facility]

## 2018-09-21 LAB
CYTOLOGIST CVX/VAG CYTO: NORMAL
PATH INTERP BLD-IMP: NORMAL

## 2018-09-21 NOTE — TELEPHONE ENCOUNTER
Reviewed stool results, we have stool culture, c diff, fecal pancreatic elastase and calprotectin but still do not have O&P results. Isa asked for these today, please ck on status on Monday. Thanks.

## 2018-09-26 NOTE — TELEPHONE ENCOUNTER
Tried to call patient and left her a message to please call office. It looks like she did not bring stool sample to the [facility] to have tested.

## 2018-09-26 NOTE — TELEPHONE ENCOUNTER
Juliana, I spoke with [facility] Lab department and she is faxing over the results of the O&P. She stated they both were negative. When I receive the result, I will scan in patient's chart.

## 2018-09-27 ENCOUNTER — TELEPHONE (OUTPATIENT)
Dept: GASTROENTEROLOGY | Facility: CLINIC | Age: 58
End: 2018-09-27

## 2018-09-27 NOTE — TELEPHONE ENCOUNTER
Please let patient know blood tests were ok (B12 and folate normal) she is still anemic with mildly decrease blood count. Stool tests all negative. How is she feeling with the Creon? Less diarrhea?

## 2018-09-27 NOTE — TELEPHONE ENCOUNTER
Spoke with patient and she stated she is still having diarrhea. She said that she does not feel that Creon has helped at all. I told her about her Lab results.

## 2018-09-27 NOTE — TELEPHONE ENCOUNTER
Please ask her to double her dose of Creon that she is using currently and take 2 per snack and 4 per meal and call back with response in 2 weeks. Continue to monitor glucose carefully.

## 2018-10-25 ENCOUNTER — OFFICE VISIT (OUTPATIENT)
Dept: GASTROENTEROLOGY | Facility: CLINIC | Age: 58
End: 2018-10-25

## 2018-10-25 VITALS
SYSTOLIC BLOOD PRESSURE: 146 MMHG | WEIGHT: 209.6 LBS | DIASTOLIC BLOOD PRESSURE: 64 MMHG | HEART RATE: 64 BPM | HEIGHT: 66 IN | OXYGEN SATURATION: 96 % | BODY MASS INDEX: 33.68 KG/M2

## 2018-10-25 DIAGNOSIS — R19.7 DIARRHEA, UNSPECIFIED TYPE: Primary | ICD-10-CM

## 2018-10-25 DIAGNOSIS — K91.5 POST-CHOLECYSTECTOMY SYNDROME: ICD-10-CM

## 2018-10-25 DIAGNOSIS — D53.9 MACROCYTIC ANEMIA: ICD-10-CM

## 2018-10-25 PROCEDURE — 99214 OFFICE O/P EST MOD 30 MIN: CPT | Performed by: PHYSICIAN ASSISTANT

## 2018-10-25 RX ORDER — MONTELUKAST SODIUM 4 MG/1
1 TABLET, CHEWABLE ORAL TAKE AS DIRECTED
Qty: 30 TABLET | Refills: 0 | Status: SHIPPED | OUTPATIENT
Start: 2018-10-25 | End: 2019-01-03

## 2018-10-25 RX ORDER — NITAZOXANIDE 500 MG/1
500 TABLET ORAL 2 TIMES DAILY WITH MEALS
Qty: 20 TABLET | Refills: 0 | Status: SHIPPED | OUTPATIENT
Start: 2018-10-25 | End: 2018-11-04

## 2018-11-26 ENCOUNTER — TELEPHONE (OUTPATIENT)
Dept: GASTROENTEROLOGY | Facility: CLINIC | Age: 58
End: 2018-11-26

## 2018-12-13 ENCOUNTER — CONSULT (OUTPATIENT)
Dept: ONCOLOGY | Facility: CLINIC | Age: 58
End: 2018-12-13

## 2018-12-13 VITALS
RESPIRATION RATE: 20 BRPM | DIASTOLIC BLOOD PRESSURE: 75 MMHG | TEMPERATURE: 98.3 F | OXYGEN SATURATION: 94 % | HEIGHT: 67 IN | WEIGHT: 208.5 LBS | HEART RATE: 70 BPM | BODY MASS INDEX: 32.72 KG/M2 | SYSTOLIC BLOOD PRESSURE: 155 MMHG

## 2018-12-13 DIAGNOSIS — R06.09 OTHER FORM OF DYSPNEA: ICD-10-CM

## 2018-12-13 DIAGNOSIS — R59.0 ENLARGED LYMPH NODE IN NECK: ICD-10-CM

## 2018-12-13 DIAGNOSIS — D75.9 DISEASE OF BLOOD AND BLOOD FORMING ORGAN: ICD-10-CM

## 2018-12-13 DIAGNOSIS — D72.819 LEUKOPENIA, UNSPECIFIED TYPE: ICD-10-CM

## 2018-12-13 DIAGNOSIS — K76.0 FATTY LIVER: ICD-10-CM

## 2018-12-13 DIAGNOSIS — D69.6 THROMBOCYTOPENIA (HCC): ICD-10-CM

## 2018-12-13 DIAGNOSIS — Z87.898 HX OF SPLENOMEGALY: ICD-10-CM

## 2018-12-13 DIAGNOSIS — D53.9 MACROCYTIC ANEMIA: Primary | ICD-10-CM

## 2018-12-13 LAB
ALBUMIN SERPL-MCNC: 4.2 G/DL (ref 3.4–4.8)
ALBUMIN/GLOB SERPL: 1.2 G/DL (ref 1.5–2.5)
ALP SERPL-CCNC: 81 U/L (ref 35–104)
ALT SERPL W P-5'-P-CCNC: 18 U/L (ref 10–36)
ANION GAP SERPL CALCULATED.3IONS-SCNC: 9.7 MMOL/L (ref 3.6–11.2)
APTT PPP: 35.6 SECONDS (ref 23.8–36.1)
AST SERPL-CCNC: 33 U/L (ref 10–30)
BASOPHILS # BLD AUTO: 0.02 10*3/MM3 (ref 0–0.3)
BASOPHILS NFR BLD AUTO: 0.6 % (ref 0–2)
BILIRUB SERPL-MCNC: 0.5 MG/DL (ref 0.2–1.8)
BUN BLD-MCNC: 37 MG/DL (ref 7–21)
BUN/CREAT SERPL: 9 (ref 7–25)
CALCIUM SPEC-SCNC: 9.7 MG/DL (ref 7.7–10)
CHLORIDE SERPL-SCNC: 104 MMOL/L (ref 99–112)
CO2 SERPL-SCNC: 28.3 MMOL/L (ref 24.3–31.9)
CREAT BLD-MCNC: 4.09 MG/DL (ref 0.43–1.29)
CRP SERPL-MCNC: 0.81 MG/DL (ref 0–0.99)
DEPRECATED RDW RBC AUTO: 53.2 FL (ref 37–54)
EOSINOPHIL # BLD AUTO: 0.2 10*3/MM3 (ref 0–0.7)
EOSINOPHIL NFR BLD AUTO: 5.7 % (ref 0–5)
ERYTHROCYTE [DISTWIDTH] IN BLOOD BY AUTOMATED COUNT: 14.5 % (ref 11.5–14.5)
FERRITIN SERPL-MCNC: 614 NG/ML (ref 10–290.3)
FIBRINOGEN PPP-MCNC: 370 MG/DL (ref 173–524)
FOLATE SERPL-MCNC: 22.79 NG/ML (ref 5.4–20)
GFR SERPL CREATININE-BSD FRML MDRD: 11 ML/MIN/1.73
GFR SERPL CREATININE-BSD FRML MDRD: ABNORMAL ML/MIN/1.73
GLOBULIN UR ELPH-MCNC: 3.6 GM/DL
GLUCOSE BLD-MCNC: 96 MG/DL (ref 70–110)
HAV IGM SERPL QL IA: NORMAL
HBV CORE IGM SERPL QL IA: NORMAL
HBV SURFACE AG SERPL QL IA: NORMAL
HCT VFR BLD AUTO: 36.3 % (ref 37–47)
HCV AB SER DONR QL: NORMAL
HETEROPH AB SER QL LA: NEGATIVE
HGB BLD-MCNC: 11.3 G/DL (ref 12–16)
HIV1+2 AB SER QL: NORMAL
IMM GRANULOCYTES # BLD: 0 10*3/MM3 (ref 0–0.03)
IMM GRANULOCYTES NFR BLD: 0 % (ref 0–0.5)
INR PPP: 1.16 (ref 0.9–1.1)
IRON 24H UR-MRATE: 81 MCG/DL (ref 49–151)
IRON SATN MFR SERPL: 25 % (ref 15–50)
LDH SERPL-CCNC: 209 U/L (ref 135–225)
LYMPHOCYTES # BLD AUTO: 1.07 10*3/MM3 (ref 1–3)
LYMPHOCYTES NFR BLD AUTO: 30.3 % (ref 21–51)
MCH RBC QN AUTO: 32.3 PG (ref 27–33)
MCHC RBC AUTO-ENTMCNC: 31.1 G/DL (ref 33–37)
MCV RBC AUTO: 103.7 FL (ref 80–94)
MONOCYTES # BLD AUTO: 0.32 10*3/MM3 (ref 0.1–0.9)
MONOCYTES NFR BLD AUTO: 9.1 % (ref 0–10)
NEUTROPHILS # BLD AUTO: 1.92 10*3/MM3 (ref 1.4–6.5)
NEUTROPHILS NFR BLD AUTO: 54.3 % (ref 30–70)
OSMOLALITY SERPL CALC.SUM OF ELEC: 291.7 MOSM/KG (ref 273–305)
PLATELET # BLD AUTO: 99 10*3/MM3 (ref 130–400)
PMV BLD AUTO: 10.4 FL (ref 6–10)
POTASSIUM BLD-SCNC: 3.6 MMOL/L (ref 3.5–5.3)
PROT SERPL-MCNC: 7.8 G/DL (ref 6–8)
PROTHROMBIN TIME: 15.1 SECONDS (ref 11–15.4)
RBC # BLD AUTO: 3.5 10*6/MM3 (ref 4.2–5.4)
RETICS #: 0.1 10*6/MM3 (ref 0.02–0.13)
RETICS/RBC NFR AUTO: 2.87 % (ref 0.5–2)
SODIUM BLD-SCNC: 142 MMOL/L (ref 135–153)
TIBC SERPL-MCNC: 322 MCG/DL (ref 241–421)
TSH SERPL DL<=0.05 MIU/L-ACNC: 2.39 MIU/ML (ref 0.55–4.78)
VIT B12 BLD-MCNC: 591 PG/ML (ref 211–911)
WBC NRBC COR # BLD: 3.53 10*3/MM3 (ref 4.5–12.5)

## 2018-12-13 PROCEDURE — 86334 IMMUNOFIX E-PHORESIS SERUM: CPT | Performed by: INTERNAL MEDICINE

## 2018-12-13 PROCEDURE — 83010 ASSAY OF HAPTOGLOBIN QUANT: CPT | Performed by: INTERNAL MEDICINE

## 2018-12-13 PROCEDURE — G0432 EIA HIV-1/HIV-2 SCREEN: HCPCS | Performed by: INTERNAL MEDICINE

## 2018-12-13 PROCEDURE — 86308 HETEROPHILE ANTIBODY SCREEN: CPT | Performed by: INTERNAL MEDICINE

## 2018-12-13 PROCEDURE — 82607 VITAMIN B-12: CPT | Performed by: INTERNAL MEDICINE

## 2018-12-13 PROCEDURE — 83550 IRON BINDING TEST: CPT | Performed by: INTERNAL MEDICINE

## 2018-12-13 PROCEDURE — 85060 BLOOD SMEAR INTERPRETATION: CPT | Performed by: INTERNAL MEDICINE

## 2018-12-13 PROCEDURE — 81206 BCR/ABL1 GENE MAJOR BP: CPT | Performed by: INTERNAL MEDICINE

## 2018-12-13 PROCEDURE — 84165 PROTEIN E-PHORESIS SERUM: CPT | Performed by: INTERNAL MEDICINE

## 2018-12-13 PROCEDURE — 85730 THROMBOPLASTIN TIME PARTIAL: CPT | Performed by: INTERNAL MEDICINE

## 2018-12-13 PROCEDURE — 86140 C-REACTIVE PROTEIN: CPT | Performed by: INTERNAL MEDICINE

## 2018-12-13 PROCEDURE — 99205 OFFICE O/P NEW HI 60 MIN: CPT | Performed by: INTERNAL MEDICINE

## 2018-12-13 PROCEDURE — 83540 ASSAY OF IRON: CPT | Performed by: INTERNAL MEDICINE

## 2018-12-13 PROCEDURE — 85025 COMPLETE CBC W/AUTO DIFF WBC: CPT | Performed by: INTERNAL MEDICINE

## 2018-12-13 PROCEDURE — 82784 ASSAY IGA/IGD/IGG/IGM EACH: CPT | Performed by: INTERNAL MEDICINE

## 2018-12-13 PROCEDURE — 84443 ASSAY THYROID STIM HORMONE: CPT | Performed by: INTERNAL MEDICINE

## 2018-12-13 PROCEDURE — 83615 LACTATE (LD) (LDH) ENZYME: CPT | Performed by: INTERNAL MEDICINE

## 2018-12-13 PROCEDURE — 85045 AUTOMATED RETICULOCYTE COUNT: CPT | Performed by: INTERNAL MEDICINE

## 2018-12-13 PROCEDURE — 82728 ASSAY OF FERRITIN: CPT | Performed by: INTERNAL MEDICINE

## 2018-12-13 PROCEDURE — 81207 BCR/ABL1 GENE MINOR BP: CPT | Performed by: INTERNAL MEDICINE

## 2018-12-13 PROCEDURE — 83883 ASSAY NEPHELOMETRY NOT SPEC: CPT | Performed by: INTERNAL MEDICINE

## 2018-12-13 PROCEDURE — 85610 PROTHROMBIN TIME: CPT | Performed by: INTERNAL MEDICINE

## 2018-12-13 PROCEDURE — 82746 ASSAY OF FOLIC ACID SERUM: CPT | Performed by: INTERNAL MEDICINE

## 2018-12-13 PROCEDURE — 80053 COMPREHEN METABOLIC PANEL: CPT | Performed by: INTERNAL MEDICINE

## 2018-12-13 PROCEDURE — 80074 ACUTE HEPATITIS PANEL: CPT | Performed by: INTERNAL MEDICINE

## 2018-12-13 PROCEDURE — 85384 FIBRINOGEN ACTIVITY: CPT | Performed by: INTERNAL MEDICINE

## 2018-12-13 NOTE — PROGRESS NOTES
"Sloane Gustafson  [id number]  1960 12/13/2018      Referring Provider:   Juliana Leone PA-C    Reason for Consultation:   Macrocytic anemia  Thrombocytopenia  Leukopenia  Splenomegaly       History of Present Illness   Sloane Gustafson is a very pleasant 58 y.o.  female who presents in new consultation at the request of Juliana Lenoe PA-C for further management of macrocytic anemia.    She reports a past medical history significant for anemia for at least several year but she is unsure as to her baseline hemoglobin. She has been following with her current Nephrologist in [place], [place] for one year and was recently started on hemodialysis on May 28th for end stage renal disease that is felt to be due to diabetes. Mr. Gustafson receives iron infusions three times weekly as per her report. She denies of any other known abnormalities with her blood counts. She has been following with Juliana Leone for ongoing \"GI problems\" which include abdomina pain, nausea, diarrhea. Her creon was recently discontinued as this was not alleviating her symptoms and she has been taking Protonix as well as Zofran and lomotil as needed which are new medications within the last one year. She otherwise does not take any medications or vitamins that are not prescribed to her. She was told several years ago that she has a fatty liver however was never aware of splenomegaly which was recently seen on abdominal ultrasound from September 2018. She denies of any bleeding no fevers, night sweats, weight loss, or lymphadenopathy. She does have intermittent dyspnea. She has never been on ESAs.      The following portions of the patient's history were reviewed and updated as appropriate: allergies, current medications, past family history, past medical history, past social history, past surgical history and problem list.      No Known Allergies      Past Medical History:   Diagnosis Date   • Anemia 2012   • Arthritis    • Asthma    • CHF " (congestive heart failure) (CMS/HCC)    • Cholelithiasis 2008 removed   • Chronic kidney disease    • Colon polyp    • COPD (chronic obstructive pulmonary disease) (CMS/HCC)    • Coronary artery disease    • Diabetes mellitus (CMS/HCC)    • Diverticulitis of colon 7/2018   • Fatty liver    • Gout    • Heart disease    • History of uterine cancer    • Hypertension    • Irritable bowel syndrome 2012 ?   • Lactose intolerance    • Stroke (CMS/HCC)    • Vision impairment    Uterine cancer twenty years ago s/p [id number]        Past Surgical History:   Procedure Laterality Date   • ABDOMINAL SURGERY  6/2008   • ARTERIOVENOUS FISTULA     • CARPAL TUNNEL RELEASE     • CATARACT EXTRACTION     • CHOLECYSTECTOMY     • COLONOSCOPY     • EYE SURGERY     • HYSTERECTOMY     • UPPER GASTROINTESTINAL ENDOSCOPY  7/3/2018         Social History     Socioeconomic History   • Marital status: Single     Spouse name: Not on file   • Number of children: Not on file   • Years of education: Not on file   • Highest education level: Not on file   Social Needs   • Financial resource strain: Not on file   • Food insecurity - worry: Not on file   • Food insecurity - inability: Not on file   • Transportation needs - medical: Not on file   • Transportation needs - non-medical: Not on file   Occupational History   • Not on file   Tobacco Use   • Smoking status: Never Smoker   • Smokeless tobacco: Never Used   Substance and Sexual Activity   • Alcohol use: No   • Drug use: No   • Sexual activity: No   Other Topics Concern   • Not on file   Social History Narrative   • Not on file   She is single, and does not have any children. She currently lives with her sister. She is a never smoker, and denies of any alcohol or illicit drug use.      Family History   Problem Relation Age of Onset   • Diabetes Other    • Heart disease Other    • Cancer Other    • Colon cancer, Breast Cancer Maternal Aunt 60s, 80s   • Colon polyps Sister    Brother - Skin cancer    Mother - Skin cance  2 Maternal Aunts - Uterine Cancer in their 50s, 60s            Current Outpatient Medications:   •  albuterol (PROAIR RESPICLICK) 108 (90 Base) MCG/ACT inhaler, Inhale Every 4 (Four) Hours As Needed for Wheezing., Disp: , Rfl:   •  allopurinol (ZYLOPRIM) 100 MG tablet, , Disp: , Rfl:   •  aspirin 81 MG EC tablet, Take 81 mg by mouth Daily., Disp: , Rfl:   •  calcitriol (ROCALTROL) 0.25 MCG capsule, , Disp: , Rfl:   •  Cetirizine HCl (ZYRTEC ALLERGY) 10 MG capsule, Take  by mouth., Disp: , Rfl:   •  colestipol (COLESTID) 1 g tablet, Take 1 tablet by mouth Take As Directed. Take 1-3 tablets PO as needed for relief of diarrhea, Disp: 30 tablet, Rfl: 0  •  fenofibrate (TRICOR) 48 MG tablet, , Disp: , Rfl:   •  Ferric Citrate (AURYXIA PO), Take  by mouth., Disp: , Rfl:   •  fluticasone (FLONASE) 50 MCG/ACT nasal spray, 2 sprays into the nostril(s) as directed by provider Daily., Disp: , Rfl:   •  furosemide (LASIX) 40 MG tablet, , Disp: , Rfl:   •  Insulin Glargine (BASAGLAR KWIKPEN) 100 UNIT/ML injection pen, Inject  under the skin., Disp: , Rfl:   •  isosorbide mononitrate (IMDUR) 30 MG 24 hr tablet, , Disp: , Rfl:   •  metOLazone (ZAROXOLYN) 5 MG tablet, , Disp: , Rfl:   •  metoprolol succinate XL (TOPROL-XL) 25 MG 24 hr tablet, , Disp: , Rfl:   •  montelukast (SINGULAIR) 10 MG tablet, , Disp: , Rfl:   •  NIFEdipine XL (PROCARDIA XL) 30 MG 24 hr tablet, , Disp: , Rfl:   •  NOVOLOG FLEXPEN 100 UNIT/ML solution pen-injector sc pen, , Disp: , Rfl:   •  ondansetron (ZOFRAN) 8 MG tablet, Take 1 tablet by mouth Every 8 (Eight) Hours As Needed for Nausea or Vomiting., Disp: 42 tablet, Rfl: 3  •  pantoprazole (PROTONIX) 40 MG EC tablet, Take 1 tablet by mouth Daily., Disp: 30 tablet, Rfl: 1  •  rosuvastatin (CRESTOR) 20 MG tablet, , Disp: , Rfl:   •  sertraline (ZOLOFT) 100 MG tablet, , Disp: , Rfl:   •  vitamin D (ERGOCALCIFEROL) 59011 units capsule capsule, , Disp: , Rfl:         Review of Systems  "  Constitutional: No fever, chills, night sweats or weight loss.   HEENT:  No headaches, positive vision changes to have surgery next week, no hearing changes, no sinus drainage, intermittent sore throat.   Cardiovascular:  No palpitations, chest pain, syncopal episodes, positive lower extremity edema.   Pulmonary:  Intermittent shortness of breath, no hemoptysis, or cough.   Gastrointestinal:  Positive nausea, no vomiting. Positive constipation amd diarrhea. No change in appetite. Positive abdominal pain. No melena or hematochezia.   Genitourinary:  No hematuria, or changes in urination.   Musculoskeletal:  Positive arthritic pain, no joint problems.   Skin: No rashes, positive intermittent pruritus.   Endocrine:  No hot flashes or chills, \"stays cold\"  Hematologic:  No history of free bleeding, spontaneous bleeding or clotting problems. No lymphadenopathy.    Immunologic: positive environmental allergies, no frequent infections.   Neurologic: Positive numbness, tingling in hands and fee, no weakness.   Psychiatric:  Positive anxiety and depression.       Physical Exam  Vital Signs: These were reviewed and listed as per patient’s electronic medical chart  Vitals:    12/13/18 1443   BP: 155/75   Pulse: 70   Resp: 20   Temp: 98.3 °F (36.8 °C)   SpO2: 94%     General: Awake, alert and oriented, in no distress, overweight  HEENT: Head is atraumatic, normocephalic, extraocular movements full, oropharynx clear, no scleral icterus, pink moist mucous membranes  Neck: supple, no jvd, cervical lymphadenopathy or masses  Cardiovascular: regular rate and rhythm without murmurs, rubs or gallops  Pulmonary: non-labored, clear to auscultation bilaterally, no wheezing  Abdomen: soft, non-tender, non-distended, normal active bowel sounds present, splenomegaly  Extremities: No clubbing, cyanosis or edema  Lymph: Positive cervical lymphadenopathy, no supraclavicular, axillary, adenopathy  Neurologic: Mental status as above, alert, " awake and oriented, grossly non-focal exam  Skin: warm, dry, intact        Labs / Studies:    Lab on 09/20/2018   Component Date Value   • Vitamin B-12 09/20/2018 386    • WBC 09/20/2018 3.17*   • RBC 09/20/2018 3.66*   • Hemoglobin 09/20/2018 11.2*   • Hematocrit 09/20/2018 35.9*   • MCV 09/20/2018 98.1*   • MCH 09/20/2018 30.6    • MCHC 09/20/2018 31.2*   • RDW 09/20/2018 15.5*   • RDW-SD 09/20/2018 55.3*   • MPV 09/20/2018 11.3*   • Platelets 09/20/2018 101*   • Folate 09/20/2018 11.12    • C-Reactive Protein 09/20/2018 0.90    • Performed by: 09/20/2018 Ge Georges    • Pathologist Interpretati* 09/20/2018 See scanned report    Lab on 08/30/2018   Component Date Value   • Amylase 08/30/2018 101*   • Glucose 08/30/2018 182*   • BUN 08/30/2018 38*   • Creatinine 08/30/2018 3.80*   • Sodium 08/30/2018 138    • Potassium 08/30/2018 4.0    • Chloride 08/30/2018 102    • CO2 08/30/2018 28.6    • Calcium 08/30/2018 9.1    • Total Protein 08/30/2018 7.3    • Albumin 08/30/2018 3.60    • ALT (SGPT) 08/30/2018 24    • AST (SGOT) 08/30/2018 35*   • Alkaline Phosphatase 08/30/2018 98    • Total Bilirubin 08/30/2018 0.7    • eGFR Non African Amer 08/30/2018 12*   • Globulin 08/30/2018 3.7    • A/G Ratio 08/30/2018 1.0*   • BUN/Creatinine Ratio 08/30/2018 10.0    • Anion Gap 08/30/2018 7.4    • C-Reactive Protein 08/30/2018 1.12*   • Lipase 08/30/2018 72*   • WBC 08/30/2018 4.73    • RBC 08/30/2018 3.57*   • Hemoglobin 08/30/2018 11.1*   • Hematocrit 08/30/2018 34.7*   • MCV 08/30/2018 97.2*   • MCH 08/30/2018 31.1    • MCHC 08/30/2018 32.0*   • RDW 08/30/2018 14.0    • RDW-SD 08/30/2018 47.3    • MPV 08/30/2018 11.8*   • Platelets 08/30/2018 99*   • Neutrophil % 08/30/2018 58.0    • Lymphocyte % 08/30/2018 26.8    • Monocyte % 08/30/2018 7.8    • Eosinophil % 08/30/2018 6.6*   • Basophil % 08/30/2018 0.6    • Immature Grans % 08/30/2018 0.2    • Neutrophils, Absolute 08/30/2018 2.74    • Lymphocytes, Absolute 08/30/2018  1.27    • Monocytes, Absolute 08/30/2018 0.37    • Eosinophils, Absolute 08/30/2018 0.31    • Basophils, Absolute 08/30/2018 0.03    • Immature Grans, Absolute 08/30/2018 0.01    • Osmolality Calc 08/30/2018 289.4    Admission on 07/03/2018, Discharged on 07/03/2018   Component Date Value   • Glucose 07/03/2018 88    • Case Report 07/03/2018                      Value:Surgical Pathology Report                         Case: [id number]                                  Authorizing Provider:  Aiden Royal MD    Collected:           07/03/2018 08:08 AM          Ordering Location:     [facility]      Received:            07/03/2018 10:00 AM                                 OPERATING ROOM DEPARTMENT                                                    Pathologist:           Lyubov Root MD                                                        Specimens:   1) - Gastric, Antrum                                                                                2) - Large Intestine, Transverse Colon                                                    • Final Diagnosis 07/03/2018                      Value:This result contains rich text formatting which cannot be displayed here.          Assessment/Plan   Sloane Gustafson is a very pleasant 58 y.o.  female who presents in new consultation at the request of Juliana Leone PA-C for further management of macrocytic anemia.    Macrocytic anemia  Thrombocytopenia  Leukopenia  Splenomegaly   - Patient has a known history of anemia but was not aware of other cell lines being decreased.   - Complete blood count today is significant for pancytopenia with a macrocytosis and peripheral smear is pending. However this does not appear to be secondary to pseudothrombocytopenia as she continued to have ongoing thrombocytopenia despite citrated or heparinized tubes.  - This is likely due to splenomegaly which was recently diagnosed. She has a history significant for  fatty liver for the last several years. AST is mildly elevated and could be due to fatty liver.  - BCR ABL PCR and flow cytometry are pending as well as SPEP and serum free light chains which can cause pancytopenia as well as a macrocytic anemia.  - B12 and folate are normal or elevated. TSH is normal. Iron panel and ferritin consistent with history of iron supplementation and show repletion with elevated ferritin.  - To assess for inflammation that may be contributing to bone marrow suppression CRP was obtained and was normal.  - PT/INR mildly elevated with normal PTT and fibrinogen. Retic count only minimally elevated with a normal LDH and haptoglobin pending therefore less likely hemolysis.  - Acute hepatitis panel, HIV, and monospot test are within normal limits.  - Given splenomegaly as ongoing symptoms (intermittent dyspnea and abdominal pain) will obtain CT neck, chest, abdomen, and pelvis for further evaluation and assessment for underlying liver disease as well lymphadenopathy.  - Will assess VON and Rheumatoid factor at her next visit.    ACO Quality measures  - Sloane Gustafson does not use tobacco products.  - Patient's Body mass index is 32.66 kg/m². BMI is above normal parameters. Recommendations include: nutrition counseling  - Current outpatient and discharge medications have been reconciled for the patient.  Reviewed by: Josefina Guerra MD      I will have the patient return in follow up appointment to review test results in two weeks. She understands that should she have any questions or concerns prior to her appointment she should give us a call at any time and I would be happy to see her sooner. It was a pleasure to see this patient in clinic today, thank you for allowing me to participate in the care of this patient.        Josefina Guerra MD  12/13/2018  3:00 PM

## 2018-12-14 LAB
LAB AP CASE REPORT: NORMAL
PATH REPORT.FINAL DX SPEC: NORMAL

## 2018-12-15 LAB
HAPTOGLOB SERPL-MCNC: 36 MG/DL (ref 34–200)
KAPPA LC SERPL-MCNC: 277.3 MG/L (ref 3.3–19.4)
KAPPA LC/LAMBDA SER: 2.05 {RATIO} (ref 0.26–1.65)
LAMBDA LC FREE SERPL-MCNC: 135 MG/L (ref 5.7–26.3)

## 2018-12-17 LAB
ALBUMIN SERPL-MCNC: 3.8 G/DL (ref 2.9–4.4)
ALBUMIN/GLOB SERPL: 1.1 {RATIO} (ref 0.7–1.7)
ALPHA1 GLOB FLD ELPH-MCNC: 0.3 G/DL (ref 0–0.4)
ALPHA2 GLOB SERPL ELPH-MCNC: 0.7 G/DL (ref 0.4–1)
B-GLOBULIN SERPL ELPH-MCNC: 1.1 G/DL (ref 0.7–1.3)
CYTOLOGIST CVX/VAG CYTO: NORMAL
GAMMA GLOB SERPL ELPH-MCNC: 1.7 G/DL (ref 0.4–1.8)
GLOBULIN SER CALC-MCNC: 3.8 G/DL (ref 2.2–3.9)
IGA SERPL-MCNC: 451 MG/DL (ref 87–352)
IGG SERPL-MCNC: 1387 MG/DL (ref 700–1600)
IGM SERPL-MCNC: 245 MG/DL (ref 26–217)
INTERPRETATION SERPL IEP-IMP: ABNORMAL
LEUK/LYMPH PHENO: NORMAL
Lab: ABNORMAL
M-SPIKE: ABNORMAL G/DL
PATH INTERP BLD-IMP: NORMAL
PROT SERPL-MCNC: 7.6 G/DL (ref 6–8.5)

## 2018-12-20 ENCOUNTER — HOSPITAL ENCOUNTER (OUTPATIENT)
Dept: CT IMAGING | Facility: HOSPITAL | Age: 58
Discharge: HOME | End: 2018-12-20
Attending: INTERNAL MEDICINE

## 2018-12-20 ENCOUNTER — HOSPITAL ENCOUNTER (OUTPATIENT)
Dept: CT IMAGING | Facility: HOSPITAL | Age: 58
Discharge: HOME | End: 2018-12-20
Attending: INTERNAL MEDICINE | Admitting: INTERNAL MEDICINE

## 2018-12-20 DIAGNOSIS — D72.819 LEUKOPENIA, UNSPECIFIED TYPE: ICD-10-CM

## 2018-12-20 DIAGNOSIS — K76.0 FATTY LIVER: ICD-10-CM

## 2018-12-20 DIAGNOSIS — Z87.898 HX OF SPLENOMEGALY: ICD-10-CM

## 2018-12-20 DIAGNOSIS — R59.0 ENLARGED LYMPH NODE IN NECK: ICD-10-CM

## 2018-12-20 LAB
INTERPRETATION: NORMAL
LAB DIRECTOR NAME PROVIDER: NORMAL
LABORATORY COMMENT REPORT: NORMAL
Lab: NORMAL
T(ABL1,BCR)B2A2/CONTROL (IS) BLD/T: NORMAL %
T(ABL1,BCR)B3A2 MAR QL: NORMAL %
T(ABL1,BCR)E1A2/CONTROL BLD/T: NORMAL %

## 2018-12-20 PROCEDURE — 74176 CT ABD & PELVIS W/O CONTRAST: CPT | Performed by: RADIOLOGY

## 2018-12-20 PROCEDURE — 74176 CT ABD & PELVIS W/O CONTRAST: CPT

## 2018-12-20 PROCEDURE — 70490 CT SOFT TISSUE NECK W/O DYE: CPT | Performed by: RADIOLOGY

## 2018-12-20 PROCEDURE — 70490 CT SOFT TISSUE NECK W/O DYE: CPT

## 2018-12-20 NOTE — PROGRESS NOTES
"Sloane Gustafson  [id number]  1960 12/27/2018      Referring Provider:   Juliana Leone PA-C    Reason for Follow Up:   Macrocytic anemia  Thrombocytopenia  Leukopenia  Splenomegaly     Chief Complaint:        History of Present Illness   Sloane Gustafson is a very pleasant 58 y.o.  female who presents in follow up appointment for further management of macrocytic anemia.    She reports a past medical history significant for anemia for at least the last several years but she is unsure as to her baseline hemoglobin. She has been following with her current Nephrologist in [place], [place] for one year and was recently started on hemodialysis on May 28th for end stage renal disease that is felt to be due to diabetes. Mr. Gustafson receives iron infusions three times weekly as per her report. She denies of any other known abnormalities with her blood counts. She has been following with Juliana Leone for ongoing \"GI problems\" which include abdomina pain, nausea, diarrhea. Her creon was recently discontinued as this was not alleviating her symptoms and she has been taking Protonix as well as Zofran and lomotil as needed which are new medications within the last one year. She otherwise does not take any medications or vitamins that are not prescribed to her. She was told several years ago that she has a fatty liver however was never aware of splenomegaly which was recently seen on abdominal ultrasound from September 2018. She denies of any bleeding no fevers, night sweats, weight loss, or lymphadenopathy. She does have intermittent dyspnea. She has never been on ESAs.    Interim History:  She recently underwent eye surgery and was evaluated by her opthalmologist after who felt like everything was well. She otherwise denies of any changes since her last visit. She does report that she received iron with her hemodialysis.        The following portions of the patient's history were reviewed and updated as appropriate: " allergies, current medications, past family history, past medical history, past social history, past surgical history and problem list.      No Known Allergies      Past Medical History:   Diagnosis Date   • Anemia 2012   • Arthritis    • Asthma    • CHF (congestive heart failure) (CMS/HCC)    • Cholelithiasis 2008 removed   • Chronic kidney disease    • Colon polyp    • COPD (chronic obstructive pulmonary disease) (CMS/HCC)    • Coronary artery disease    • Diabetes mellitus (CMS/HCC)    • Diverticulitis of colon 7/2018   • Fatty liver    • Gout    • Heart disease    • History of uterine cancer    • Hypertension    • Irritable bowel syndrome 2012 ?   • Lactose intolerance    • Stroke (CMS/[facility])    • Vision impairment    Uterine cancer twenty years ago s/p [id number]        Past Surgical History:   Procedure Laterality Date   • ABDOMINAL SURGERY  6/2008   • ARTERIOVENOUS FISTULA     • CARPAL TUNNEL RELEASE     • CATARACT EXTRACTION     • CHOLECYSTECTOMY     • COLONOSCOPY     • COLONOSCOPY N/A 7/3/2018    Procedure: COLONOSCOPY;  Surgeon: Aiden Royal MD;  Location: [facility];  Service: Gastroenterology   • ENDOSCOPY N/A 7/3/2018    Procedure: ESOPHAGOGASTRODUODENOSCOPY W/ BIOPSY;  Surgeon: Aiden Royal MD;  Location: [facility];  Service: Gastroenterology   • EYE SURGERY     • HYSTERECTOMY     • UPPER GASTROINTESTINAL ENDOSCOPY  7/3/2018         Social History     Socioeconomic History   • Marital status: Single     Spouse name: Not on file   • Number of children: Not on file   • Years of education: Not on file   • Highest education level: Not on file   Social Needs   • Financial resource strain: Not on file   • Food insecurity - worry: Not on file   • Food insecurity - inability: Not on file   • Transportation needs - medical: Not on file   • Transportation needs - non-medical: Not on file   Occupational History   • Not on file   Tobacco Use   • Smoking status: Never Smoker   • Smokeless tobacco: Never  Used   Substance and Sexual Activity   • Alcohol use: No   • Drug use: No   • Sexual activity: No   Other Topics Concern   • Not on file   Social History Narrative   • Not on file   She is single, and does not have any children. She currently lives with her sister. She is a never smoker, and denies of any alcohol or illicit drug use.      Family History   Problem Relation Age of Onset   • Diabetes Other    • Heart disease Other    • Cancer Other    • Colon cancer, Breast Cancer Maternal Aunt 60s, 80s   • Colon polyps Sister    Brother - Skin cancer   Mother - Skin cance  2 Maternal Aunts - Uterine Cancer in their 50s, 60s            Current Outpatient Medications:   •  albuterol (PROAIR RESPICLICK) 108 (90 Base) MCG/ACT inhaler, Inhale Every 4 (Four) Hours As Needed for Wheezing., Disp: , Rfl:   •  allopurinol (ZYLOPRIM) 100 MG tablet, , Disp: , Rfl:   •  aspirin 81 MG EC tablet, Take 81 mg by mouth Daily., Disp: , Rfl:   •  calcitriol (ROCALTROL) 0.25 MCG capsule, , Disp: , Rfl:   •  Cetirizine HCl (ZYRTEC ALLERGY) 10 MG capsule, Take  by mouth., Disp: , Rfl:   •  colestipol (COLESTID) 1 g tablet, Take 1 tablet by mouth Take As Directed. Take 1-3 tablets PO as needed for relief of diarrhea, Disp: 30 tablet, Rfl: 0  •  fenofibrate (TRICOR) 48 MG tablet, , Disp: , Rfl:   •  Ferric Citrate (AURYXIA PO), Take  by mouth., Disp: , Rfl:   •  fluticasone (FLONASE) 50 MCG/ACT nasal spray, 2 sprays into the nostril(s) as directed by provider Daily., Disp: , Rfl:   •  furosemide (LASIX) 40 MG tablet, , Disp: , Rfl:   •  Insulin Glargine (BASAGLAR KWIKPEN) 100 UNIT/ML injection pen, Inject  under the skin., Disp: , Rfl:   •  isosorbide mononitrate (IMDUR) 30 MG 24 hr tablet, , Disp: , Rfl:   •  metOLazone (ZAROXOLYN) 5 MG tablet, , Disp: , Rfl:   •  metoprolol succinate XL (TOPROL-XL) 25 MG 24 hr tablet, , Disp: , Rfl:   •  montelukast (SINGULAIR) 10 MG tablet, , Disp: , Rfl:   •  NIFEdipine XL (PROCARDIA XL) 30 MG 24 hr  tablet, , Disp: , Rfl:   •  NOVOLOG FLEXPEN 100 UNIT/ML solution pen-injector sc pen, , Disp: , Rfl:   •  ondansetron (ZOFRAN) 8 MG tablet, Take 1 tablet by mouth Every 8 (Eight) Hours As Needed for Nausea or Vomiting., Disp: 42 tablet, Rfl: 3  •  pantoprazole (PROTONIX) 40 MG EC tablet, Take 1 tablet by mouth Daily., Disp: 30 tablet, Rfl: 1  •  rosuvastatin (CRESTOR) 20 MG tablet, , Disp: , Rfl:   •  sertraline (ZOLOFT) 100 MG tablet, , Disp: , Rfl:   •  vitamin D (ERGOCALCIFEROL) 96372 units capsule capsule, , Disp: , Rfl:         Review of Systems   Pertinent positives are listed as per history of present of illness, all other systems reviewed and are negative.        Physical Exam  Vital Signs: These were reviewed and listed as per patient’s electronic medical chart  Vitals:    12/27/18 1430   BP: 161/67   Pulse: 72   Resp: 18   Temp: 98.5 °F (36.9 °C)   SpO2: 99%     General: Awake, alert and oriented, in no distress, overweight  HEENT: Head is atraumatic, normocephalic, extraocular movements full, oropharynx clear, no scleral icterus, pink moist mucous membranes  Neck: supple, no jvd, cervical lymphadenopathy or masses  Cardiovascular: regular rate and rhythm without murmurs, rubs or gallops  Pulmonary: non-labored, clear to auscultation bilaterally, no wheezing  Abdomen: soft, non-tender, non-distended, normal active bowel sounds present  Extremities: No clubbing, cyanosis or edema  Lymph: Positive cervical lymphadenopathy, no supraclavicular adenopathy  Neurologic: Mental status as above, alert, awake and oriented, grossly non-focal exam  Skin: warm, dry, intact  Patient was examined on 12/27/18 and changes are reflected and noted above.          Labs / Studies:    Office Visit on 12/27/2018   Component Date Value   • Rheumatoid Factor Quanti* 12/27/2018 7.0    • Reticulocyte % 12/27/2018 2.75*   • Reticulocyte Absolute 12/27/2018 0.1012    • WBC 12/27/2018 2.86*   • RBC 12/27/2018 3.68*   • Hemoglobin  12/27/2018 12.1    • Hematocrit 12/27/2018 37.8    • MCV 12/27/2018 102.7*   • MCH 12/27/2018 32.9    • MCHC 12/27/2018 32.0*   • RDW 12/27/2018 14.5    • RDW-SD 12/27/2018 52.9    • MPV 12/27/2018 11.0*   • Platelets 12/27/2018 97*   • Neutrophil % 12/27/2018 56.7    • Lymphocyte % 12/27/2018 29.7    • Monocyte % 12/27/2018 7.7    • Eosinophil % 12/27/2018 4.9    • Basophil % 12/27/2018 0.7    • Immature Grans % 12/27/2018 0.3    • Neutrophils, Absolute 12/27/2018 1.62    • Lymphocytes, Absolute 12/27/2018 0.85*   • Monocytes, Absolute 12/27/2018 0.22    • Eosinophils, Absolute 12/27/2018 0.14    • Basophils, Absolute 12/27/2018 0.02    • Immature Grans, Absolute 12/27/2018 0.01    Consult on 12/13/2018   Component Date Value   • Performed by: 12/13/2018 Ge Georges    • Pathologist Interpretati* 12/13/2018 See scanned report    • Glucose 12/13/2018 96    • BUN 12/13/2018 37*   • Creatinine 12/13/2018 4.09*   • Sodium 12/13/2018 142    • Potassium 12/13/2018 3.6    • Chloride 12/13/2018 104    • CO2 12/13/2018 28.3    • Calcium 12/13/2018 9.7    • Total Protein 12/13/2018 7.8    • Albumin 12/13/2018 4.20    • ALT (SGPT) 12/13/2018 18    • AST (SGOT) 12/13/2018 33*   • Alkaline Phosphatase 12/13/2018 81    • Total Bilirubin 12/13/2018 0.5    • eGFR Non African Amer 12/13/2018 11*   • eGFR   Amer 12/13/2018     • Globulin 12/13/2018 3.6    • A/G Ratio 12/13/2018 1.2*   • BUN/Creatinine Ratio 12/13/2018 9.0    • Anion Gap 12/13/2018 9.7    • Protime 12/13/2018 15.1    • INR 12/13/2018 1.16*   • PTT 12/13/2018 35.6    • Fibrinogen 12/13/2018 370    • LDH 12/13/2018 209    • Reticulocyte % 12/13/2018 2.87*   • Reticulocyte Absolute 12/13/2018 0.1005    • Haptoglobin 12/13/2018 36    • Iron 12/13/2018 81    • TIBC 12/13/2018 322    • Iron Saturation 12/13/2018 25    • Ferritin 12/13/2018 614.00*   • Vitamin B-12 12/13/2018 591    • Folate 12/13/2018 22.79*   • TSH 12/13/2018 2.391    • IgG 12/13/2018 1387    •  IgA 12/13/2018 451*   • IgM 12/13/2018 245*   • Total Protein 12/13/2018 7.6    • Albumin 12/13/2018 3.8    • Alpha-1-Globulin 12/13/2018 0.3    • Alpha-2-Globulin 12/13/2018 0.7    • Beta Globulin 12/13/2018 1.1    • Gamma Globulin 12/13/2018 1.7    • M-Gus 12/13/2018 Not Observed    • Globulin 12/13/2018 3.8    • A/G Ratio 12/13/2018 1.1    • Immunofixation Reflex, S* 12/13/2018 Comment    • Please note 12/13/2018 Comment    • Free Light Chain, [place] 12/13/2018 277.3*   • Free Lambda Light Chains 12/13/2018 135.0*   • Kappa/Lambda Ratio 12/13/2018 2.05*   • Leuk/Lymph Pheno 12/13/2018 SEE SCANNED REPORT     • e13a2 (b2a2) Transcript 12/13/2018 Comment    • e14a2 (b3a2) Transcript 12/13/2018 Comment    • E1A2 transcript 12/13/2018 Comment    • Interpretation 12/13/2018 Comment    • Director Review 12/13/2018 Comment    • Background: 12/13/2018 Comment    • Methodology: 12/13/2018 Comment    • Hepatitis B Surface Ag 12/13/2018 Non-Reactive    • Hep A IgM 12/13/2018 Non-Reactive    • Hep B C IgM 12/13/2018 Non-Reactive    • Hepatitis C Ab 12/13/2018 Non-Reactive    • C-Reactive Protein 12/13/2018 0.81    • Monospot 12/13/2018 Negative    • WBC 12/13/2018 3.53*   • RBC 12/13/2018 3.50*   • Hemoglobin 12/13/2018 11.3*   • Hematocrit 12/13/2018 36.3*   • MCV 12/13/2018 103.7*   • MCH 12/13/2018 32.3    • MCHC 12/13/2018 31.1*   • RDW 12/13/2018 14.5    • RDW-SD 12/13/2018 53.2    • MPV 12/13/2018 10.4*   • Platelets 12/13/2018 99*   • Neutrophil % 12/13/2018 54.3    • Lymphocyte % 12/13/2018 30.3    • Monocyte % 12/13/2018 9.1    • Eosinophil % 12/13/2018 5.7*   • Basophil % 12/13/2018 0.6    • Immature Grans % 12/13/2018 0.0    • Neutrophils, Absolute 12/13/2018 1.92    • Lymphocytes, Absolute 12/13/2018 1.07    • Monocytes, Absolute 12/13/2018 0.32    • Eosinophils, Absolute 12/13/2018 0.20    • Basophils, Absolute 12/13/2018 0.02    • Immature Grans, Absolute 12/13/2018 0.00    • HIV-1/ HIV-2 12/13/2018  Non-Reactive    • Osmolality Calc 12/13/2018 291.7    Lab on 09/20/2018   Component Date Value   • Vitamin B-12 09/20/2018 386    • WBC 09/20/2018 3.17*   • RBC 09/20/2018 3.66*   • Hemoglobin 09/20/2018 11.2*   • Hematocrit 09/20/2018 35.9*   • MCV 09/20/2018 98.1*   • MCH 09/20/2018 30.6    • MCHC 09/20/2018 31.2*   • RDW 09/20/2018 15.5*   • RDW-SD 09/20/2018 55.3*   • MPV 09/20/2018 11.3*   • Platelets 09/20/2018 101*   • Folate 09/20/2018 11.12    • C-Reactive Protein 09/20/2018 0.90    • Performed by: 09/20/2018 Ge Georges    • Pathologist Interpretati* 09/20/2018 See scanned report    Lab on 08/30/2018   Component Date Value   • Amylase 08/30/2018 101*   • Glucose 08/30/2018 182*   • BUN 08/30/2018 38*   • Creatinine 08/30/2018 3.80*   • Sodium 08/30/2018 138    • Potassium 08/30/2018 4.0    • Chloride 08/30/2018 102    • CO2 08/30/2018 28.6    • Calcium 08/30/2018 9.1    • Total Protein 08/30/2018 7.3    • Albumin 08/30/2018 3.60    • ALT (SGPT) 08/30/2018 24    • AST (SGOT) 08/30/2018 35*   • Alkaline Phosphatase 08/30/2018 98    • Total Bilirubin 08/30/2018 0.7    • eGFR Non African Amer 08/30/2018 12*   • Globulin 08/30/2018 3.7    • A/G Ratio 08/30/2018 1.0*   • BUN/Creatinine Ratio 08/30/2018 10.0    • Anion Gap 08/30/2018 7.4    • C-Reactive Protein 08/30/2018 1.12*   • Lipase 08/30/2018 72*   • WBC 08/30/2018 4.73    • RBC 08/30/2018 3.57*   • Hemoglobin 08/30/2018 11.1*   • Hematocrit 08/30/2018 34.7*   • MCV 08/30/2018 97.2*   • MCH 08/30/2018 31.1    • MCHC 08/30/2018 32.0*   • RDW 08/30/2018 14.0    • RDW-SD 08/30/2018 47.3    • MPV 08/30/2018 11.8*   • Platelets 08/30/2018 99*   • Neutrophil % 08/30/2018 58.0    • Lymphocyte % 08/30/2018 26.8    • Monocyte % 08/30/2018 7.8    • Eosinophil % 08/30/2018 6.6*   • Basophil % 08/30/2018 0.6    • Immature Grans % 08/30/2018 0.2    • Neutrophils, Absolute 08/30/2018 2.74    • Lymphocytes, Absolute 08/30/2018 1.27    • Monocytes, Absolute 08/30/2018  0.37    • Eosinophils, Absolute 2018 0.31    • Basophils, Absolute 2018 0.03    • Immature Grans, Absolute 2018 0.01    • Osmolality Calc 2018 289.4    Admission on 2018, Discharged on 2018   Component Date Value   • Glucose 2018 88    • Case Report 2018                      Value:Surgical Pathology Report                         Case: [id number]                                  Authorizing Provider:  Aiden Royal MD   Collected:           2018 08:08 AM          Ordering Location:     [facility]      Received:            2018 10:00 AM                                 OPERATING ROOM DEPARTMENT                                                    Pathologist:           Lyubov Root MD                                                       Specimens:   1) - Gastric, Antrum                                                                                2) - Large Intestine, Transverse Colon                                                    • Final Diagnosis 2018                      Value:This result contains rich text formatting which cannot be displayed here.          PATHOLOGY:  18:  Peripheral Smear:        Flow Cytometry:                                             [id number]: CT SOFT TISSUE NECK WITHOUT CONTRAST: Spiral scans were obtained from the skull base and extended to the thoracic inlet. This was done without IV contrast. The images acquired at the skull base show air-fluid level within the globe on the left. The included portion of the right globe was unremarkable. The maxillary sinuses are clear. There is no evidence of mass in the naso or oropharynx region. The parotid glands are prominent in size but show no focal abnormalities. The submandibular glands were unremarkable. There was no evidence of tumor or mass at the level of vocal cords. There were no enlarged lymph nodes demonstrated along the  jugular lymph node chains in the neck. All the lymph nodes were less than a centimeter in diameter. There was no supraclavicular lymphadenopathy. IMPRESSION: The right eye is abnormal with a large air-fluid level in it. No soft tissue masses or enlarged lymph nodes were demonstrated in the neck.      12/20/2018: CT ABDOMEN PELVIS WITHOUT CONTRAST: Oral contrast only was administered. Spiral scans were obtained from the lung bases and extended through the pelvis. There was some contrast in the distal esophagus, this is typically associated with GE reflux. The GE junction was unremarkable. There was no evidence of hiatal hernia. The liver shows very mild fatty change. No focal liver lesions were seen. The gallbladder is surgically absent. The spleen is homogeneous in density but enlarged. In the AP dimension it measures 16.7 cm. In the cephalocaudad dimension it measures 13 cm. The pancreas was unremarkable. The abdominal aorta shows calcific plaque in the wall but no aneurysmal dilatation. There were no enlarged retroperitoneal or mesenteric lymph nodes. The bowel shows no evidence of obstruction. There is a small umbilical hernia. There is no ascites or free air. In the pelvis there is mild diverticulosis of the sigmoid colon. IMPRESSION: Very mild fatty changes noted in the liver. The spleen is enlarged. No lymphadenopathy is demonstrated in the abdomen or pelvis. There is a small umbilical hernia and minimal diverticulosis in the sigmoid colon.                Assessment/Plan   Sloane Gustafson is a very pleasant 58 y.o.  female who presents in follow up appointment for further management of macrocytic anemia.    Macrocytic anemia  Thrombocytopenia  Leukopenia  Splenomegaly   Fatty Liver    - Patient has a known history of anemia but was not aware of other cell lines being decreased.   - Complete blood count today is significant for pancytopenia with a macrocytosis and peripheral smear shows a pancytopenia  with macrocytic anemia without blasts. Thrombocytopenia does not appear to be secondary to pseudothrombocytopenia as there was no platelet clumping seen on peripheral smear.  - Pancytopenia is likely due to splenomegaly which was recently diagnosed. She also has a history significant for fatty liver for the last several years. AST is mildly elevated and could be due to fatty liver.  - BCR ABL PCR and flow cytometry are pending as well as SPEP and serum free light chains which can cause pancytopenia as well as a macrocytic anemia.  - B12 and folate are normal or elevated. TSH is normal. Iron panel and ferritin consistent with history of iron supplementation (receiving with hemodialysis) and show repletion with elevated ferritin.  - To assess for inflammation that may be contributing to bone marrow suppression CRP was obtained and was normal.  - PT/INR mildly elevated with normal PTT and fibrinogen. Retic count only minimally elevated with a normal LDH and haptoglobin normal although low normal however less likely hemolysis.  - Acute hepatitis panel, HIV, and monospot test are within normal limits.  - Given splenomegaly as ongoing symptoms (intermittent dyspnea and abdominal pain) I did obtain CT neck, chest (which was not performed due to insurance approval), abdomen, and pelvis. This was significant for fatty liver, splenomegaly but no lymphadenopathy.   - Imaging results were discussed in great detail with the patient including changes of fatty liver and its significance  - She was advised of weight loss and to continue to follow with her primary care provider in regards to routine healthcare exams and ongoing medical management of diseases that could be contributing such as but not limited to diabetes, hypertension and cholesterol.   - She was also advised of alcohol avoidance and decreasing hepatotoxic medications.  - SPEP obtained did not reveal an M spike or abnormal immunofixation however there was elevation  in IgM and IgA. Both serum free light chains were elevated with a mildly elevated ratio however is still considered within normal range for chronic kidney disease patients and changes may be due to underlying inflammation. Will however further evaluate with 24 hour urine studies as patient still makes urine as well as a skeletal survey.  - Will assess VON and Rheumatoid factor, CMV, EBV panels as well as JAK2 V617F given splenomegaly.  - If workup remains unrevealing I have recommended a bone marrow biopsy to further evaluate for an underlying hematological disorder which I discussed with her at length today. We also spent some time discussing the differential for splenomegaly and pancytopenia.    ACO Quality measures  - Sloane Gustafson does not use tobacco products.  - Patient's BMI is above normal parameters. We discussed weight loss to help with her medical issues as well as fatty liver.  - Current outpatient and discharge medications have been reconciled for the patient.  Reviewed by: Josefina Guerra MD      I will have the patient return in follow up appointment to review test results in one month. She understands that should she have any questions or concerns prior to her appointment she should give us a call at any time and I would be happy to see her sooner. It was a pleasure to see this patient in clinic today, thank you for allowing me to participate in the care of this patient.        Josefina Guerra MD  12/27/2018  3:26 PM

## 2018-12-20 NOTE — PROGRESS NOTES
CT SOFT TISSUE NECK WITHOUT CONTRAST-     REASON FOR EXAM: Splenomegaly, neck lymphadenopathy; D72.819-Decreased  white blood cell count, unspecified; Z87.898-Personal history of other  specified conditions; K76.0-Fatty (change of) liver, not elsewhere  classified; R59.0-Localized enlarged lymph nodes     FINDINGS: Spiral scans were obtained from the skull base and extended to  the thoracic inlet. This was done without IV contrast. The images  acquired at the skull base show air-fluid level within the globe on the  left. The included portion of the right globe was unremarkable. The  maxillary sinuses are clear. There is no evidence of mass in the naso or  oropharynx region. The parotid glands are prominent in size but show no  focal abnormalities. The submandibular glands were unremarkable. There  was no evidence of tumor or mass at the level of vocal cords. There were  no enlarged lymph nodes demonstrated along the jugular lymph node chains  in the neck. All the lymph nodes were less than a centimeter in  diameter. There was no supraclavicular lymphadenopathy.    Patient's CT scan was discussed with her and she reports that she sees a retinal specialist and recently underwent left eye surgery which likely accounts for the air fluid levels within the left globe. She was advised however to discuss this with her retinal specialist to make sure this would be normal for her and would need to seek medical attention should she have worsening pain or vision changes.

## 2018-12-27 ENCOUNTER — HOSPITAL ENCOUNTER (OUTPATIENT)
Dept: GENERAL RADIOLOGY | Facility: HOSPITAL | Age: 58
Discharge: HOME | End: 2018-12-27
Attending: INTERNAL MEDICINE | Admitting: INTERNAL MEDICINE

## 2018-12-27 ENCOUNTER — OFFICE VISIT (OUTPATIENT)
Dept: ONCOLOGY | Facility: CLINIC | Age: 58
End: 2018-12-27

## 2018-12-27 VITALS
OXYGEN SATURATION: 99 % | SYSTOLIC BLOOD PRESSURE: 161 MMHG | HEART RATE: 72 BPM | RESPIRATION RATE: 18 BRPM | TEMPERATURE: 98.5 F | DIASTOLIC BLOOD PRESSURE: 67 MMHG

## 2018-12-27 DIAGNOSIS — K76.0 FATTY LIVER: ICD-10-CM

## 2018-12-27 DIAGNOSIS — R76.8 ELEVATED SERUM IMMUNOGLOBULIN FREE LIGHT CHAIN LEVEL: ICD-10-CM

## 2018-12-27 DIAGNOSIS — Z87.898 HX OF SPLENOMEGALY: ICD-10-CM

## 2018-12-27 DIAGNOSIS — D69.6 THROMBOCYTOPENIA (HCC): ICD-10-CM

## 2018-12-27 DIAGNOSIS — D53.9 MACROCYTIC ANEMIA: Primary | ICD-10-CM

## 2018-12-27 DIAGNOSIS — D72.819 LEUKOPENIA, UNSPECIFIED TYPE: ICD-10-CM

## 2018-12-27 LAB
BASOPHILS # BLD AUTO: 0.02 10*3/MM3 (ref 0–0.3)
BASOPHILS NFR BLD AUTO: 0.7 % (ref 0–2)
CHROMATIN AB SERPL-ACNC: 7 IU/ML (ref 0–14)
DEPRECATED RDW RBC AUTO: 52.9 FL (ref 37–54)
EOSINOPHIL # BLD AUTO: 0.14 10*3/MM3 (ref 0–0.7)
EOSINOPHIL NFR BLD AUTO: 4.9 % (ref 0–5)
ERYTHROCYTE [DISTWIDTH] IN BLOOD BY AUTOMATED COUNT: 14.5 % (ref 11.5–14.5)
HCT VFR BLD AUTO: 37.8 % (ref 37–47)
HGB BLD-MCNC: 12.1 G/DL (ref 12–16)
IMM GRANULOCYTES # BLD AUTO: 0.01 10*3/MM3 (ref 0–0.03)
IMM GRANULOCYTES NFR BLD AUTO: 0.3 % (ref 0–0.5)
LDH SERPL-CCNC: 183 U/L (ref 135–225)
LYMPHOCYTES # BLD AUTO: 0.85 10*3/MM3 (ref 1–3)
LYMPHOCYTES NFR BLD AUTO: 29.7 % (ref 21–51)
MCH RBC QN AUTO: 32.9 PG (ref 27–33)
MCHC RBC AUTO-ENTMCNC: 32 G/DL (ref 33–37)
MCV RBC AUTO: 102.7 FL (ref 80–94)
MONOCYTES # BLD AUTO: 0.22 10*3/MM3 (ref 0.1–0.9)
MONOCYTES NFR BLD AUTO: 7.7 % (ref 0–10)
NEUTROPHILS # BLD AUTO: 1.62 10*3/MM3 (ref 1.4–6.5)
NEUTROPHILS NFR BLD AUTO: 56.7 % (ref 30–70)
PLATELET # BLD AUTO: 97 10*3/MM3 (ref 130–400)
PMV BLD AUTO: 11 FL (ref 6–10)
RBC # BLD AUTO: 3.68 10*6/MM3 (ref 4.2–5.4)
RETICS # AUTO: 0.1 10*6/MM3 (ref 0.02–0.13)
RETICS/RBC NFR AUTO: 2.75 % (ref 0.5–2)
WBC NRBC COR # BLD: 2.86 10*3/MM3 (ref 4.5–12.5)

## 2018-12-27 PROCEDURE — 86644 CMV ANTIBODY: CPT | Performed by: INTERNAL MEDICINE

## 2018-12-27 PROCEDURE — 85025 COMPLETE CBC W/AUTO DIFF WBC: CPT | Performed by: INTERNAL MEDICINE

## 2018-12-27 PROCEDURE — 83615 LACTATE (LD) (LDH) ENZYME: CPT | Performed by: INTERNAL MEDICINE

## 2018-12-27 PROCEDURE — 86431 RHEUMATOID FACTOR QUANT: CPT | Performed by: INTERNAL MEDICINE

## 2018-12-27 PROCEDURE — 81270 JAK2 GENE: CPT | Performed by: INTERNAL MEDICINE

## 2018-12-27 PROCEDURE — 83010 ASSAY OF HAPTOGLOBIN QUANT: CPT | Performed by: INTERNAL MEDICINE

## 2018-12-27 PROCEDURE — 86663 EPSTEIN-BARR ANTIBODY: CPT | Performed by: INTERNAL MEDICINE

## 2018-12-27 PROCEDURE — 86645 CMV ANTIBODY IGM: CPT | Performed by: INTERNAL MEDICINE

## 2018-12-27 PROCEDURE — 86665 EPSTEIN-BARR CAPSID VCA: CPT | Performed by: INTERNAL MEDICINE

## 2018-12-27 PROCEDURE — 86664 EPSTEIN-BARR NUCLEAR ANTIGEN: CPT | Performed by: INTERNAL MEDICINE

## 2018-12-27 PROCEDURE — 77075 RADEX OSSEOUS SURVEY COMPL: CPT

## 2018-12-27 PROCEDURE — 36415 COLL VENOUS BLD VENIPUNCTURE: CPT | Performed by: INTERNAL MEDICINE

## 2018-12-27 PROCEDURE — 86038 ANTINUCLEAR ANTIBODIES: CPT | Performed by: INTERNAL MEDICINE

## 2018-12-27 PROCEDURE — 99214 OFFICE O/P EST MOD 30 MIN: CPT | Performed by: INTERNAL MEDICINE

## 2018-12-27 PROCEDURE — 85045 AUTOMATED RETICULOCYTE COUNT: CPT | Performed by: INTERNAL MEDICINE

## 2018-12-27 PROCEDURE — 77075 RADEX OSSEOUS SURVEY COMPL: CPT | Performed by: RADIOLOGY

## 2018-12-28 LAB
ANA SER QL: NEGATIVE
EBV EA IGG SER-ACNC: <9 U/ML (ref 0–8.9)
EBV NA IGG SER IA-ACNC: >600 U/ML (ref 0–17.9)
EBV VCA IGG SER-ACNC: >600 U/ML (ref 0–17.9)
EBV VCA IGM SER-ACNC: <36 U/ML (ref 0–35.9)
HAPTOGLOB SERPL-MCNC: 33 MG/DL (ref 34–200)
INTERPRETATION: ABNORMAL

## 2018-12-29 LAB
CMV IGG SERPL IA-ACNC: >10 U/ML (ref 0–0.59)
CMV IGM SERPL IA-ACNC: <30 AU/ML (ref 0–29.9)

## 2018-12-31 LAB
JAK2 P.V617F BLD/T QL: NORMAL
LAB DIRECTOR NAME PROVIDER: NORMAL
LABORATORY COMMENT REPORT: NORMAL

## 2019-01-03 ENCOUNTER — OFFICE VISIT (OUTPATIENT)
Dept: GASTROENTEROLOGY | Facility: CLINIC | Age: 59
End: 2019-01-03

## 2019-01-03 VITALS
HEIGHT: 67 IN | DIASTOLIC BLOOD PRESSURE: 61 MMHG | BODY MASS INDEX: 32.68 KG/M2 | WEIGHT: 208.2 LBS | HEART RATE: 70 BPM | OXYGEN SATURATION: 97 % | SYSTOLIC BLOOD PRESSURE: 129 MMHG

## 2019-01-03 DIAGNOSIS — K58.0 IRRITABLE BOWEL SYNDROME WITH DIARRHEA: Primary | ICD-10-CM

## 2019-01-03 PROCEDURE — 99213 OFFICE O/P EST LOW 20 MIN: CPT | Performed by: PHYSICIAN ASSISTANT

## 2019-01-03 NOTE — PROGRESS NOTES
: 1960    Chief Complaint   Patient presents with   • Diarrhea       Sloane Gustafson is a 58 y.o. female who presents to the office today as a follow up appointment regarding diarrhea.    History of Present Illness:  Since her initial consultation several months ago, she reports about 30-40% improvement in her GI symptoms. She has been taking Colestipol 1-3 times per day but had stopped taking it for a while because she didn't feel that it was helping at all. She resumed taking it a few days ago. Last night after dialysis, she had severe diarrhea with more than 12 watery and mucous stools which were bright yellow in color. She has severe abdominal cramping which is relieved with bowel movements when her symptoms are the worst. She has been told by her dialysis nurses that her diarrhea seems to be better because she is not needing to be disconnected in order to rush to the restroom as she was previously. Prior to GI consultation, she was taking Imodium daily without relief. She was given Rx for Lomotil last visit and she has only taken it 5 times. She feels that it works very well when she takes it. Denies severe GERD symptoms and takes Protonix 40 mg once daily. She took Alinia therapy in the past without relief which was approved by her insurance.     Labs 2018:  CRP normal  B12 normal  Folate normal  WBC 3.17  Hgb 11.2  HCT 35.9  MCV 98.1  Plt 101     7/3/18 EGD and colonoscopy performed by Dr. Royal which revealed: Mild gastritis, mild sigmoid diverticulosis, single pedunculated 3-4 mm transverse colon polyp. Pathology showed mild chronic gastritis and colon polyps was a tubular adenoma. No random colon biopsies were taken. Her maternal aunt had colon cancer. There is no known first degree family history of colon cancer. Mother had colon polyps (she thinks). Father had colon resection related to diverticulitis.      Labs 2018:  Hgb decr 11.1  HCT 34.7  WBC 4.73  MCV elev 97.2  Platelets decr  99  Eosinophil % elev 6.6  AST elev 35  Amylase 101 mildly elev  Lipase 72 mildly elev  CRP 1.12 elev     Stool tests performed at [facility] 9/2018:  Culture normal  C diff negative  Fecal calprotectin normal  Pancreatic elastase normal  O&P negative per verbal report from lab     Abdominal film 8/30/2018 normal     9/11/2018 US abd: (ordered due to noted firmness during abdominal exam). Fatty changes in the liver with mild splenomegaly.  Cholesterol has been elevated in the past but reports that when it was last checked, it was reported as normal. She monitors her glucose daily. Denies any alcohol use.      Review of Systems   Constitutional: Positive for fatigue. Negative for chills and fever.   HENT: Negative for trouble swallowing.    Eyes: Positive for visual disturbance.   Respiratory: Positive for shortness of breath. Negative for cough, choking and chest tightness.    Cardiovascular: Positive for chest pain.   Gastrointestinal: Positive for abdominal distention, abdominal pain, constipation, diarrhea and nausea. Negative for anal bleeding, blood in stool, rectal pain and vomiting.   Endocrine: Positive for cold intolerance. Negative for heat intolerance.   Genitourinary: Negative.    Musculoskeletal: Positive for arthralgias, back pain and myalgias.   Skin: Negative.    Allergic/Immunologic: Positive for environmental allergies. Negative for food allergies.   Neurological: Positive for dizziness and light-headedness.   Hematological: Bruises/bleeds easily.   Psychiatric/Behavioral: Positive for sleep disturbance. The patient is nervous/anxious.        Past Medical History:   Diagnosis Date   • Anemia 2012   • Arthritis    • Asthma    • CHF (congestive heart failure) (CMS/[facility])    • Cholelithiasis 2008 removed   • Chronic kidney disease    • Colon polyp    • COPD (chronic obstructive pulmonary disease) (CMS/[facility])    • Coronary artery disease    • Diabetes mellitus (CMS/[facility])    • Diverticulitis of colon 7/2018   •  Fatty liver    • Gout    • Heart disease    • History of uterine cancer    • Hypertension    • Irritable bowel syndrome 2012 ?   • Lactose intolerance    • Stroke (CMS/HCC)    • Vision impairment        Past Surgical History:   Procedure Laterality Date   • ABDOMINAL SURGERY  6/2008   • ARTERIOVENOUS FISTULA     • CARPAL TUNNEL RELEASE     • CATARACT EXTRACTION     • CHOLECYSTECTOMY     • COLONOSCOPY     • COLONOSCOPY N/A 7/3/2018    Procedure: COLONOSCOPY;  Surgeon: Aiden Royal MD;  Location: [facility] OR;  Service: Gastroenterology   • ENDOSCOPY N/A 7/3/2018    Procedure: ESOPHAGOGASTRODUODENOSCOPY W/ BIOPSY;  Surgeon: Aiden Royal MD;  Location: [facility] OR;  Service: Gastroenterology   • EYE SURGERY     • HYSTERECTOMY     • UPPER GASTROINTESTINAL ENDOSCOPY  7/3/2018       Family History   Problem Relation Age of Onset   • Diabetes Other    • Heart disease Other    • Cancer Other    • Colon cancer Maternal Aunt    • Colon polyps Sister        Social History     Socioeconomic History   • Marital status: Single     Spouse name: Not on file   • Number of children: Not on file   • Years of education: Not on file   • Highest education level: Not on file   Tobacco Use   • Smoking status: Never Smoker   • Smokeless tobacco: Never Used   Substance and Sexual Activity   • Alcohol use: No   • Drug use: No   • Sexual activity: No       Current Outpatient Medications:   •  albuterol (PROAIR RESPICLICK) 108 (90 Base) MCG/ACT inhaler, Inhale Every 4 (Four) Hours As Needed for Wheezing., Disp: , Rfl:   •  allopurinol (ZYLOPRIM) 100 MG tablet, , Disp: , Rfl:   •  aspirin 81 MG EC tablet, Take 81 mg by mouth Daily., Disp: , Rfl:   •  calcitriol (ROCALTROL) 0.25 MCG capsule, , Disp: , Rfl:   •  Cetirizine HCl (ZYRTEC ALLERGY) 10 MG capsule, Take  by mouth., Disp: , Rfl:   •  colestipol (COLESTID) 1 g tablet, Take 1 tablet by mouth Take As Directed. Take 1-3 tablets PO as needed for relief of diarrhea, Disp: 30 tablet,  "Rfl: 0  •  fenofibrate (TRICOR) 48 MG tablet, , Disp: , Rfl:   •  Ferric Citrate (AURYXIA PO), Take  by mouth., Disp: , Rfl:   •  fluticasone (FLONASE) 50 MCG/ACT nasal spray, 2 sprays into the nostril(s) as directed by provider Daily., Disp: , Rfl:   •  furosemide (LASIX) 40 MG tablet, , Disp: , Rfl:   •  Insulin Glargine (BASAGLAR KWIKPEN) 100 UNIT/ML injection pen, Inject  under the skin., Disp: , Rfl:   •  isosorbide mononitrate (IMDUR) 30 MG 24 hr tablet, , Disp: , Rfl:   •  metOLazone (ZAROXOLYN) 5 MG tablet, , Disp: , Rfl:   •  metoprolol succinate XL (TOPROL-XL) 25 MG 24 hr tablet, , Disp: , Rfl:   •  montelukast (SINGULAIR) 10 MG tablet, , Disp: , Rfl:   •  NIFEdipine XL (PROCARDIA XL) 30 MG 24 hr tablet, , Disp: , Rfl:   •  NOVOLOG FLEXPEN 100 UNIT/ML solution pen-injector sc pen, , Disp: , Rfl:   •  ondansetron (ZOFRAN) 8 MG tablet, Take 1 tablet by mouth Every 8 (Eight) Hours As Needed for Nausea or Vomiting., Disp: 42 tablet, Rfl: 3  •  pantoprazole (PROTONIX) 40 MG EC tablet, Take 1 tablet by mouth Daily., Disp: 30 tablet, Rfl: 1  •  rosuvastatin (CRESTOR) 20 MG tablet, , Disp: , Rfl:   •  sertraline (ZOLOFT) 100 MG tablet, , Disp: , Rfl:   •  vitamin D (ERGOCALCIFEROL) 02518 units capsule capsule, , Disp: , Rfl:     Allergies:   Patient has no known allergies.    Vitals:  /61 (BP Location: Right arm, Patient Position: Sitting, Cuff Size: Adult)   Pulse 70   Ht 170.2 cm (67\")   Wt 94.4 kg (208 lb 3.2 oz)   SpO2 97%   BMI 32.61 kg/m²     Physical Exam   Constitutional: She is oriented to person, place, and time. She appears well-developed and well-nourished. No distress.   HENT:   Head: Normocephalic and atraumatic.   Nose: Nose normal.   Mouth/Throat: Oropharynx is clear and moist.   Eyes: Conjunctivae are normal. Right eye exhibits no discharge. Left eye exhibits no discharge. No scleral icterus.   Neck: Normal range of motion. No JVD present.   Cardiovascular: Normal rate, regular rhythm " and normal heart sounds. Exam reveals no gallop and no friction rub.   No murmur heard.  Pulmonary/Chest: Effort normal and breath sounds normal. No respiratory distress. She has no wheezes. She has no rales. She exhibits no tenderness.   Abdominal: Soft. Bowel sounds are normal. She exhibits no mass. There is hepatosplenomegaly. There is tenderness (generalized, mild).   Very notable truncal obesity   Musculoskeletal: Normal range of motion. She exhibits no edema or deformity.   Fistula noted in proximal left upper extremity with ecchymosis. Very thin upper and lower extremities.   Neurological: She is alert and oriented to person, place, and time. Coordination normal.   Skin: Skin is warm and dry. No rash noted. She is not diaphoretic. No erythema. There is pallor.   Psychiatric: She has a normal mood and affect. Her behavior is normal. Judgment and thought content normal.   Vitals reviewed.      Assessment/Plan:  1. Irritable bowel syndrome with diarrhea      Diarrhea is some improved but still not well controlled. Stop Colestipol for now. Continue Lomotil only as needed when symptoms are worst. Take Xifaxan therapy as directed for next 14 days. Add Konsyl to daily regimen for bulking of stools.     New Medications Ordered This Visit   Medications   • rifaximin (XIFAXAN) 550 MG tablet     Sig: Take 1 tablet by mouth 3 (Three) Times a Day.     Dispense:  42 tablet     Refill:  3   • psyllium (KONSYL DAILY FIBER) 100 % pack packet     Sig: Take 1 packet by mouth Daily. If no packets, use 1 tsp daily     Dispense:  1 bottle     Refill:  3           Return in about 6 weeks (around 2/14/2019) for recheck diarrhea.      Electronically signed 1/3/2019 1:21 PM  Juliana Leone PA-C, [facility]

## 2019-01-03 NOTE — PATIENT INSTRUCTIONS
Take Konsyl every other day for the first 1 week then increase to daily. Take Xifaxan 3 times daily for 2 weeks, may repeat treatment up to 3 times if need with 2-3 weeks in between treatments.

## 2019-01-07 ENCOUNTER — TELEPHONE (OUTPATIENT)
Dept: GASTROENTEROLOGY | Facility: CLINIC | Age: 59
End: 2019-01-07

## 2019-01-07 DIAGNOSIS — K58.0 IRRITABLE BOWEL SYNDROME WITH DIARRHEA: ICD-10-CM

## 2019-01-07 NOTE — TELEPHONE ENCOUNTER
Spoke with patient. She is going to come by and sign patient assistance forms. She has an appointment on 1-22 with another doctor and will come by and sign it then if she is not in [place] before that date.

## 2019-01-17 NOTE — PROGRESS NOTES
"Sloane Gustafson  [id number]  1960 1/22/2019      Referring Provider:   Juliana Leone PA-C    Reason for Follow Up:   Macrocytic anemia  Thrombocytopenia  Leukopenia  Splenomegaly     Chief Complaint:  Macrocytic anemia  Thrombocytopenia  Leukopenia  Splenomegaly         History of Present Illness   Sloane Gustafson is a very pleasant 58 y.o.  female who presents in follow up appointment for further management of macrocytic anemia.    She reports a past medical history significant for anemia for at least the last several years but she is unsure as to her baseline hemoglobin. She has been following with her current Nephrologist in [place], [place] for one year and was recently started on hemodialysis on May 28th for end stage renal disease that is felt to be due to diabetes. Mr. Gustafson receives iron infusions three times weekly as per her report. She denies of any other known abnormalities with her blood counts. She has been following with Juliana Leone for ongoing \"GI problems\" which include abdomina pain, nausea, diarrhea. Her creon was recently discontinued as this was not alleviating her symptoms and she has been taking Protonix as well as Zofran and lomotil as needed which are new medications within the last one year. She otherwise does not take any medications or vitamins that are not prescribed to her. She was told several years ago that she has a fatty liver however was never aware of splenomegaly which was recently seen on abdominal ultrasound from September 2018. She denies of any bleeding no fevers, night sweats, weight loss, or lymphadenopathy. She does have intermittent dyspnea. She has never been on ESAs. She recently underwent eye surgery and was evaluated by her opthalmologist after who felt like everything was well. She otherwise denies of any changes since her last visit. She does report that she received iron with her hemodialysis.    Interim History:  Patient recently has been feeling dizzy " and has had falls at home. She denies of any bleeding.        The following portions of the patient's history were reviewed and updated as appropriate: allergies, current medications, past family history, past medical history, past social history, past surgical history and problem list.      No Known Allergies      Past Medical History:   Diagnosis Date   • Anemia 2012   • Arthritis    • Asthma    • CHF (congestive heart failure) (CMS/HCC)    • Cholelithiasis 2008 removed   • Chronic kidney disease    • Colon polyp    • COPD (chronic obstructive pulmonary disease) (CMS/HCC)    • Coronary artery disease    • Diabetes mellitus (CMS/HCC)    • Diverticulitis of colon 7/2018   • Fatty liver    • Gout    • Heart disease    • History of uterine cancer    • Hypertension    • Irritable bowel syndrome 2012 ?   • Lactose intolerance    • Stroke (CMS/HCC)    • Vision impairment    Uterine cancer twenty years ago s/p [id number]        Past Surgical History:   Procedure Laterality Date   • ABDOMINAL SURGERY  6/2008   • ARTERIOVENOUS FISTULA     • CARPAL TUNNEL RELEASE     • CATARACT EXTRACTION     • CHOLECYSTECTOMY     • COLONOSCOPY     • COLONOSCOPY N/A 7/3/2018    Procedure: COLONOSCOPY;  Surgeon: Aiden Royal MD;  Location: [facility];  Service: Gastroenterology   • ENDOSCOPY N/A 7/3/2018    Procedure: ESOPHAGOGASTRODUODENOSCOPY W/ BIOPSY;  Surgeon: Aiden Royal MD;  Location: [facility] OR;  Service: Gastroenterology   • EYE SURGERY     • HYSTERECTOMY     • UPPER GASTROINTESTINAL ENDOSCOPY  7/3/2018         Social History     Socioeconomic History   • Marital status: Single     Spouse name: Not on file   • Number of children: Not on file   • Years of education: Not on file   • Highest education level: Not on file   Social Needs   • Financial resource strain: Not on file   • Food insecurity - worry: Not on file   • Food insecurity - inability: Not on file   • Transportation needs - medical: Not on file   •  Transportation needs - non-medical: Not on file   Occupational History   • Not on file   Tobacco Use   • Smoking status: Never Smoker   • Smokeless tobacco: Never Used   Substance and Sexual Activity   • Alcohol use: No   • Drug use: No   • Sexual activity: No   Other Topics Concern   • Not on file   Social History Narrative   • Not on file   She is single, and does not have any children. She currently lives with her sister. She is a never smoker, and denies of any alcohol or illicit drug use.      Family History   Problem Relation Age of Onset   • Diabetes Other    • Heart disease Other    • Cancer Other    • Colon cancer, Breast Cancer Maternal Aunt 60s, 80s   • Colon polyps Sister    Brother - Skin cancer   Mother - Skin cance  2 Maternal Aunts - Uterine Cancer in their 50s, 60s            Current Outpatient Medications:   •  albuterol (PROAIR RESPICLICK) 108 (90 Base) MCG/ACT inhaler, Inhale Every 4 (Four) Hours As Needed for Wheezing., Disp: , Rfl:   •  allopurinol (ZYLOPRIM) 100 MG tablet, , Disp: , Rfl:   •  aspirin 81 MG EC tablet, Take 81 mg by mouth Daily., Disp: , Rfl:   •  calcitriol (ROCALTROL) 0.25 MCG capsule, , Disp: , Rfl:   •  Cetirizine HCl (ZYRTEC ALLERGY) 10 MG capsule, Take  by mouth., Disp: , Rfl:   •  fenofibrate (TRICOR) 48 MG tablet, , Disp: , Rfl:   •  Ferric Citrate (AURYXIA PO), Take  by mouth., Disp: , Rfl:   •  fluticasone (FLONASE) 50 MCG/ACT nasal spray, 2 sprays into the nostril(s) as directed by provider Daily., Disp: , Rfl:   •  furosemide (LASIX) 40 MG tablet, , Disp: , Rfl:   •  Insulin Glargine (BASAGLAR KWIKPEN) 100 UNIT/ML injection pen, Inject  under the skin., Disp: , Rfl:   •  isosorbide mononitrate (IMDUR) 30 MG 24 hr tablet, , Disp: , Rfl:   •  metOLazone (ZAROXOLYN) 5 MG tablet, , Disp: , Rfl:   •  metoprolol succinate XL (TOPROL-XL) 25 MG 24 hr tablet, , Disp: , Rfl:   •  montelukast (SINGULAIR) 10 MG tablet, , Disp: , Rfl:   •  NIFEdipine XL (PROCARDIA XL) 30 MG 24 hr  tablet, , Disp: , Rfl:   •  NOVOLOG FLEXPEN 100 UNIT/ML solution pen-injector sc pen, , Disp: , Rfl:   •  ondansetron (ZOFRAN) 8 MG tablet, Take 1 tablet by mouth Every 8 (Eight) Hours As Needed for Nausea or Vomiting., Disp: 42 tablet, Rfl: 3  •  pantoprazole (PROTONIX) 40 MG EC tablet, Take 1 tablet by mouth Daily., Disp: 30 tablet, Rfl: 1  •  psyllium (KONSYL DAILY FIBER) 100 % pack packet, Take 1 packet by mouth Daily. If no packets, use 1 tsp daily, Disp: 1 bottle, Rfl: 3  •  rifaximin (XIFAXAN) 550 MG tablet, Take 1 tablet by mouth 3 (Three) Times a Day., Disp: 42 tablet, Rfl: 3  •  rosuvastatin (CRESTOR) 20 MG tablet, , Disp: , Rfl:   •  sertraline (ZOLOFT) 100 MG tablet, , Disp: , Rfl:   •  vitamin D (ERGOCALCIFEROL) 59270 units capsule capsule, , Disp: , Rfl:         Review of Systems   Pertinent positives are listed as per history of present of illness, all other systems reviewed and are negative.        Physical Exam  Vital Signs: These were reviewed and listed as per patient’s electronic medical chart  Vitals:    01/22/19 1345   BP: 144/76   Pulse: 76   Resp: 18   Temp: 98 °F (36.7 °C)   SpO2: 96%     General: Awake, alert and oriented, in no distress, overweight  HEENT: Head is atraumatic, normocephalic, extraocular movements full, oropharynx clear, no scleral icterus, pink moist mucous membranes  Neck: supple, no jvd, cervical lymphadenopathy or masses  Cardiovascular: regular rate and rhythm without murmurs, rubs or gallops  Pulmonary: non-labored, clear to auscultation bilaterally, no wheezing  Abdomen: soft, non-tender, non-distended, normal active bowel sounds present  Extremities: No clubbing, cyanosis or edema  Lymph: Positive cervical lymphadenopathy, no supraclavicular adenopathy  Neurologic: Mental status as above, alert, awake and oriented, grossly non-focal exam  Skin: warm, dry, intact  Patient was examined on 01/22/19 and changes are reflected and noted above.        Labs /  Studies:    Office Visit on 01/22/2019   Component Date Value   • Reticulocyte % 01/22/2019 1.24    • Reticulocyte Absolute 01/22/2019 0.0427    • LDH 01/22/2019 194    • Protime 01/22/2019 15.4    • INR 01/22/2019 1.20*   • PTT 01/22/2019 37.6*   • WBC 01/22/2019 3.83*   • RBC 01/22/2019 3.44*   • Hemoglobin 01/22/2019 10.9*   • Hematocrit 01/22/2019 33.8*   • MCV 01/22/2019 98.3*   • MCH 01/22/2019 31.7    • MCHC 01/22/2019 32.2*   • RDW 01/22/2019 13.6    • RDW-SD 01/22/2019 49.2    • MPV 01/22/2019 11.3*   • Platelets 01/22/2019 98*   • Neutrophil % 01/22/2019 55.7    • Lymphocyte % 01/22/2019 29.2    • Monocyte % 01/22/2019 9.9    • Eosinophil % 01/22/2019 4.4    • Basophil % 01/22/2019 0.5    • Immature Grans % 01/22/2019 0.3    • Neutrophils, Absolute 01/22/2019 2.13    • Lymphocytes, Absolute 01/22/2019 1.12    • Monocytes, Absolute 01/22/2019 0.38    • Eosinophils, Absolute 01/22/2019 0.17    • Basophils, Absolute 01/22/2019 0.02    • Immature Grans, Absolute 01/22/2019 0.01    Office Visit on 12/27/2018   Component Date Value   • VON Direct 12/27/2018 Negative    • Rheumatoid Factor Quanti* 12/27/2018 7.0    • EBV VCA IgM 12/27/2018 <36.0    • EBV Early Antigen Ab, IgG 12/27/2018 <9.0    • EBV VCA IgG 12/27/2018 >600.0*   • EBV Nuclear Antigen Ab, * 12/27/2018 >600.0*   • Interpretation 12/27/2018 Comment    • CMV IgG 12/27/2018 >10.00*   • CMV IgM 12/27/2018 <30.0    • LDH 12/27/2018 183    • Reticulocyte % 12/27/2018 2.75*   • Reticulocyte Absolute 12/27/2018 0.1012    • Haptoglobin 12/27/2018 33*   • JAK2 V617F Mutation 12/27/2018 Comment    • Background: 12/27/2018 Comment    • Director Review 12/27/2018 Comment    • WBC 12/27/2018 2.86*   • RBC 12/27/2018 3.68*   • Hemoglobin 12/27/2018 12.1    • Hematocrit 12/27/2018 37.8    • MCV 12/27/2018 102.7*   • MCH 12/27/2018 32.9    • MCHC 12/27/2018 32.0*   • RDW 12/27/2018 14.5    • RDW-SD 12/27/2018 52.9    • MPV 12/27/2018 11.0*   • Platelets 12/27/2018  97*   • Neutrophil % 12/27/2018 56.7    • Lymphocyte % 12/27/2018 29.7    • Monocyte % 12/27/2018 7.7    • Eosinophil % 12/27/2018 4.9    • Basophil % 12/27/2018 0.7    • Immature Grans % 12/27/2018 0.3    • Neutrophils, Absolute 12/27/2018 1.62    • Lymphocytes, Absolute 12/27/2018 0.85*   • Monocytes, Absolute 12/27/2018 0.22    • Eosinophils, Absolute 12/27/2018 0.14    • Basophils, Absolute 12/27/2018 0.02    • Immature Grans, Absolute 12/27/2018 0.01    Consult on 12/13/2018   Component Date Value   • Performed by: 12/13/2018 Ge Georges    • Pathologist Interpretati* 12/13/2018 See scanned report    • Glucose 12/13/2018 96    • BUN 12/13/2018 37*   • Creatinine 12/13/2018 4.09*   • Sodium 12/13/2018 142    • Potassium 12/13/2018 3.6    • Chloride 12/13/2018 104    • CO2 12/13/2018 28.3    • Calcium 12/13/2018 9.7    • Total Protein 12/13/2018 7.8    • Albumin 12/13/2018 4.20    • ALT (SGPT) 12/13/2018 18    • AST (SGOT) 12/13/2018 33*   • Alkaline Phosphatase 12/13/2018 81    • Total Bilirubin 12/13/2018 0.5    • eGFR Non African Amer 12/13/2018 11*   • eGFR   Amer 12/13/2018     • Globulin 12/13/2018 3.6    • A/G Ratio 12/13/2018 1.2*   • BUN/Creatinine Ratio 12/13/2018 9.0    • Anion Gap 12/13/2018 9.7    • Protime 12/13/2018 15.1    • INR 12/13/2018 1.16*   • PTT 12/13/2018 35.6    • Fibrinogen 12/13/2018 370    • LDH 12/13/2018 209    • Reticulocyte % 12/13/2018 2.87*   • Reticulocyte Absolute 12/13/2018 0.1005    • Haptoglobin 12/13/2018 36    • Iron 12/13/2018 81    • TIBC 12/13/2018 322    • Iron Saturation 12/13/2018 25    • Ferritin 12/13/2018 614.00*   • Vitamin B-12 12/13/2018 591    • Folate 12/13/2018 22.79*   • TSH 12/13/2018 2.391    • IgG 12/13/2018 1387    • IgA 12/13/2018 451*   • IgM 12/13/2018 245*   • Total Protein 12/13/2018 7.6    • Albumin 12/13/2018 3.8    • Alpha-1-Globulin 12/13/2018 0.3    • Alpha-2-Globulin 12/13/2018 0.7    • Beta Globulin 12/13/2018 1.1    • Gamma  Globulin 12/13/2018 1.7    • M-Gus 12/13/2018 Not Observed    • Globulin 12/13/2018 3.8    • A/G Ratio 12/13/2018 1.1    • Immunofixation Reflex, S* 12/13/2018 Comment    • Please note 12/13/2018 Comment    • Free Light Chain, [place] 12/13/2018 277.3*   • Free Lambda Light Chains 12/13/2018 135.0*   • Kappa/Lambda Ratio 12/13/2018 2.05*   • Leuk/Lymph Pheno 12/13/2018 SEE SCANNED REPORT     • e13a2 (b2a2) Transcript 12/13/2018 Comment    • e14a2 (b3a2) Transcript 12/13/2018 Comment    • E1A2 transcript 12/13/2018 Comment    • Interpretation 12/13/2018 Comment    • Director Review 12/13/2018 Comment    • Background: 12/13/2018 Comment    • Methodology: 12/13/2018 Comment    • Hepatitis B Surface Ag 12/13/2018 Non-Reactive    • Hep A IgM 12/13/2018 Non-Reactive    • Hep B C IgM 12/13/2018 Non-Reactive    • Hepatitis C Ab 12/13/2018 Non-Reactive    • C-Reactive Protein 12/13/2018 0.81    • Monospot 12/13/2018 Negative    • WBC 12/13/2018 3.53*   • RBC 12/13/2018 3.50*   • Hemoglobin 12/13/2018 11.3*   • Hematocrit 12/13/2018 36.3*   • MCV 12/13/2018 103.7*   • MCH 12/13/2018 32.3    • MCHC 12/13/2018 31.1*   • RDW 12/13/2018 14.5    • RDW-SD 12/13/2018 53.2    • MPV 12/13/2018 10.4*   • Platelets 12/13/2018 99*   • Neutrophil % 12/13/2018 54.3    • Lymphocyte % 12/13/2018 30.3    • Monocyte % 12/13/2018 9.1    • Eosinophil % 12/13/2018 5.7*   • Basophil % 12/13/2018 0.6    • Immature Grans % 12/13/2018 0.0    • Neutrophils, Absolute 12/13/2018 1.92    • Lymphocytes, Absolute 12/13/2018 1.07    • Monocytes, Absolute 12/13/2018 0.32    • Eosinophils, Absolute 12/13/2018 0.20    • Basophils, Absolute 12/13/2018 0.02    • Immature Grans, Absolute 12/13/2018 0.00    • HIV-1/ HIV-2 12/13/2018 Non-Reactive    • Osmolality Calc 12/13/2018 291.7    Lab on 09/20/2018   Component Date Value   • Vitamin B-12 09/20/2018 386    • WBC 09/20/2018 3.17*   • RBC 09/20/2018 3.66*   • Hemoglobin 09/20/2018 11.2*   • Hematocrit 09/20/2018  35.9*   • MCV 09/20/2018 98.1*   • MCH 09/20/2018 30.6    • MCHC 09/20/2018 31.2*   • RDW 09/20/2018 15.5*   • RDW-SD 09/20/2018 55.3*   • MPV 09/20/2018 11.3*   • Platelets 09/20/2018 101*   • Folate 09/20/2018 11.12    • C-Reactive Protein 09/20/2018 0.90    • Performed by: 09/20/2018 Ge Georges    • Pathologist Interpretati* 09/20/2018 See scanned report    Lab on 08/30/2018   Component Date Value   • Amylase 08/30/2018 101*   • Glucose 08/30/2018 182*   • BUN 08/30/2018 38*   • Creatinine 08/30/2018 3.80*   • Sodium 08/30/2018 138    • Potassium 08/30/2018 4.0    • Chloride 08/30/2018 102    • CO2 08/30/2018 28.6    • Calcium 08/30/2018 9.1    • Total Protein 08/30/2018 7.3    • Albumin 08/30/2018 3.60    • ALT (SGPT) 08/30/2018 24    • AST (SGOT) 08/30/2018 35*   • Alkaline Phosphatase 08/30/2018 98    • Total Bilirubin 08/30/2018 0.7    • eGFR Non African Amer 08/30/2018 12*   • Globulin 08/30/2018 3.7    • A/G Ratio 08/30/2018 1.0*   • BUN/Creatinine Ratio 08/30/2018 10.0    • Anion Gap 08/30/2018 7.4    • C-Reactive Protein 08/30/2018 1.12*   • Lipase 08/30/2018 72*   • WBC 08/30/2018 4.73    • RBC 08/30/2018 3.57*   • Hemoglobin 08/30/2018 11.1*   • Hematocrit 08/30/2018 34.7*   • MCV 08/30/2018 97.2*   • MCH 08/30/2018 31.1    • MCHC 08/30/2018 32.0*   • RDW 08/30/2018 14.0    • RDW-SD 08/30/2018 47.3    • MPV 08/30/2018 11.8*   • Platelets 08/30/2018 99*   • Neutrophil % 08/30/2018 58.0    • Lymphocyte % 08/30/2018 26.8    • Monocyte % 08/30/2018 7.8    • Eosinophil % 08/30/2018 6.6*   • Basophil % 08/30/2018 0.6    • Immature Grans % 08/30/2018 0.2    • Neutrophils, Absolute 08/30/2018 2.74    • Lymphocytes, Absolute 08/30/2018 1.27    • Monocytes, Absolute 08/30/2018 0.37    • Eosinophils, Absolute 08/30/2018 0.31    • Basophils, Absolute 08/30/2018 0.03    • Immature Grans, Absolute 08/30/2018 0.01    • Osmolality Calc 08/30/2018 289.4           PATHOLOGY:  12/14/18:  Peripheral Smear:        Flow  Cytometry:                                       18:            [id number]: CT SOFT TISSUE NECK WITHOUT CONTRAST: Spiral scans were obtained from the skull base and extended to the thoracic inlet. This was done without IV contrast. The images acquired at the skull base show air-fluid level within the globe on the left. The included portion of the right globe was unremarkable. The maxillary sinuses are clear. There is no evidence of mass in the naso or oropharynx region. The parotid glands are prominent in size but show no focal abnormalities. The submandibular glands were unremarkable. There was no evidence of tumor or mass at the level of vocal cords. There were no enlarged lymph nodes demonstrated along the jugular lymph node chains in the neck. All the lymph nodes were less than a centimeter in diameter. There was no supraclavicular lymphadenopathy. IMPRESSION: The right eye is abnormal with a large air-fluid level in it. No soft tissue masses or enlarged lymph nodes were demonstrated in the neck.      2018: CT ABDOMEN PELVIS WITHOUT CONTRAST: Oral contrast only was administered. Spiral scans were obtained from the lung bases and extended through the pelvis. There was some contrast in the distal esophagus, this is typically associated with GE reflux. The GE junction was unremarkable. There was no evidence of hiatal hernia. The liver shows very mild fatty change. No focal liver lesions were seen. The gallbladder is surgically absent. The spleen is homogeneous in density but enlarged. In the AP dimension it measures 16.7 cm. In the cephalocaudad dimension it measures 13 cm. The pancreas was unremarkable. The abdominal aorta shows calcific plaque in the wall but no aneurysmal dilatation. There were no enlarged retroperitoneal or mesenteric lymph nodes. The bowel shows no evidence of obstruction. There is a small umbilical hernia. There is no ascites or free air. In the pelvis there is mild  diverticulosis of the sigmoid colon. IMPRESSION: Very mild fatty changes noted in the liver. The spleen is enlarged. No lymphadenopathy is demonstrated in the abdomen or pelvis. There is a small umbilical hernia and minimal diverticulosis in the sigmoid colon.      12/27/18: XR BONE SURVEY COMPLETE: FINDINGS: 26 images are presented. No lytic lesions are identified. There is a sclerotic density in the right lateral 3rd rib. No other sclerotic abnormalities are identified. Arthritic change in the spine. IMPRESSION: Single sclerotic focus in the right lateral 3rd rib. Nonspecific.          Assessment/Plan   Sloane Gustafson is a very pleasant 58 y.o.  female who presents in follow up appointment for further management of macrocytic anemia.    Macrocytic anemia  Thrombocytopenia  Leukopenia  Splenomegaly   Fatty Liver    - Patient has a known history of anemia but was not aware of other cell lines being decreased.   - Complete blood count today is significant for pancytopenia with a macrocytosis and peripheral smear shows a pancytopenia with macrocytic anemia without blasts. Thrombocytopenia does not appear to be secondary to pseudothrombocytopenia as there was no platelet clumping seen on peripheral smear.  - Pancytopenia is likely due to splenomegaly which was recently diagnosed. She also has a history significant for fatty liver for the last several years. AST is mildly elevated and could be due to fatty liver.  - BCR ABL PCR is <0.0032% and flow cytometry did not reveal any abnormalities.   - B12 and folate are normal or elevated. TSH is normal. Iron panel and ferritin consistent with history of iron supplementation (receiving with hemodialysis) and show repletion with elevated ferritin.  - To assess for inflammation that may be contributing to bone marrow suppression CRP was obtained and was normal.  - PT/INR mildly elevated with normal PTT and fibrinogen. Retic count only minimally elevated with a normal  LDH and haptoglobin normal although low normal however less likely hemolysis.  - Acute hepatitis panel, HIV, and monospot test are within normal limits.  - Given splenomegaly as ongoing symptoms (intermittent dyspnea and abdominal pain) I did obtain CT neck, chest (which was not performed due to insurance approval), abdomen, and pelvis. This was significant for fatty liver, splenomegaly but no lymphadenopathy.   - Imaging results were discussed in great detail with the patient including changes of fatty liver and its significance  - She was advised of weight loss and to continue to follow with her primary care provider in regards to routine healthcare exams and ongoing medical management of diseases that could be contributing such as but not limited to diabetes, hypertension and cholesterol.   - She was also advised of alcohol avoidance and decreasing hepatotoxic medications.  - SPEP obtained did not reveal an M spike or abnormal immunofixation however there was elevation in IgM and IgA. Both serum free light chains were elevated with a mildly elevated ratio however is still considered within normal range for chronic kidney disease patients and changes may be due to underlying inflammation. Skeletal survey showed a non specific sclerotic focus in the right lateral 3rd rib, will monitor with repeat skeletal survey in the future as patient is asymptomatic. Urine studies are pending.   - Given splenomegaly further workup was obtained: VON and Rheumatoid factor were normal, CMV, EBV panels indicated a prior infection, JAK2 V617F did not reveal any mutations.   - I have recommended a bone marrow biopsy to further evaluate for an underlying hematological disorder which I discussed with her at length today as well as risks and benefits as well as alternatives and she was agreeable to proceed. We also spent some time discussing the differential for splenomegaly and pancytopenia.    Hypoglycemia  - Patient's glucose was  checked given her symptoms and blood sugar was 45. She was given juice and Gatorade in clinic which subsequently improved her glucose. She will further discuss this with her primary provider today as this is likely contributing to her symptoms and will need dose adjustment on her diabetic medications. She was advised to cut back on her insulin and may need holding of her medications during hypoglycemic episodes.    ACO Quality measures  - Sloane Gustafson does not use tobacco products.  - Patient's BMI is above normal parameters. We discussed weight loss to help with her medical issues as well as fatty liver.  - Current outpatient and discharge medications have been reconciled for the patient.  Reviewed by: Josefina Guerar MD      I will have the patient return for bone marrow next week. She understands that should she have any questions or concerns prior to her appointment she should give us a call at any time and I would be happy to see her sooner. It was a pleasure to see this patient in clinic today, thank you for allowing me to participate in the care of this patient.        Josefina Guerra MD  01/22/2019  3:34 PM

## 2019-01-22 ENCOUNTER — OFFICE VISIT (OUTPATIENT)
Dept: ONCOLOGY | Facility: CLINIC | Age: 59
End: 2019-01-22

## 2019-01-22 VITALS
HEART RATE: 76 BPM | SYSTOLIC BLOOD PRESSURE: 144 MMHG | DIASTOLIC BLOOD PRESSURE: 76 MMHG | OXYGEN SATURATION: 96 % | WEIGHT: 209 LBS | TEMPERATURE: 98 F | BODY MASS INDEX: 32.73 KG/M2 | RESPIRATION RATE: 18 BRPM

## 2019-01-22 DIAGNOSIS — D72.819 LEUKOPENIA, UNSPECIFIED TYPE: ICD-10-CM

## 2019-01-22 DIAGNOSIS — R42 DIZZINESS: ICD-10-CM

## 2019-01-22 DIAGNOSIS — Z87.898 HX OF SPLENOMEGALY: ICD-10-CM

## 2019-01-22 DIAGNOSIS — D53.9 MACROCYTIC ANEMIA: Primary | ICD-10-CM

## 2019-01-22 DIAGNOSIS — D69.6 THROMBOCYTOPENIA (HCC): ICD-10-CM

## 2019-01-22 LAB
APTT PPP: 37.6 SECONDS (ref 23.8–36.1)
BASOPHILS # BLD AUTO: 0.02 10*3/MM3 (ref 0–0.3)
BASOPHILS NFR BLD AUTO: 0.5 % (ref 0–2)
DEPRECATED RDW RBC AUTO: 49.2 FL (ref 37–54)
EOSINOPHIL # BLD AUTO: 0.17 10*3/MM3 (ref 0–0.7)
EOSINOPHIL NFR BLD AUTO: 4.4 % (ref 0–5)
ERYTHROCYTE [DISTWIDTH] IN BLOOD BY AUTOMATED COUNT: 13.6 % (ref 11.5–14.5)
HCT VFR BLD AUTO: 33.8 % (ref 37–47)
HGB BLD-MCNC: 10.9 G/DL (ref 12–16)
IMM GRANULOCYTES # BLD AUTO: 0.01 10*3/MM3 (ref 0–0.03)
IMM GRANULOCYTES NFR BLD AUTO: 0.3 % (ref 0–0.5)
INR PPP: 1.2 (ref 0.9–1.1)
LDH SERPL-CCNC: 194 U/L (ref 135–225)
LYMPHOCYTES # BLD AUTO: 1.12 10*3/MM3 (ref 1–3)
LYMPHOCYTES NFR BLD AUTO: 29.2 % (ref 21–51)
MCH RBC QN AUTO: 31.7 PG (ref 27–33)
MCHC RBC AUTO-ENTMCNC: 32.2 G/DL (ref 33–37)
MCV RBC AUTO: 98.3 FL (ref 80–94)
MONOCYTES # BLD AUTO: 0.38 10*3/MM3 (ref 0.1–0.9)
MONOCYTES NFR BLD AUTO: 9.9 % (ref 0–10)
NEUTROPHILS # BLD AUTO: 2.13 10*3/MM3 (ref 1.4–6.5)
NEUTROPHILS NFR BLD AUTO: 55.7 % (ref 30–70)
PLATELET # BLD AUTO: 98 10*3/MM3 (ref 130–400)
PMV BLD AUTO: 11.3 FL (ref 6–10)
PROTHROMBIN TIME: 15.4 SECONDS (ref 11–15.4)
RBC # BLD AUTO: 3.44 10*6/MM3 (ref 4.2–5.4)
RETICS # AUTO: 0.04 10*6/MM3 (ref 0.02–0.13)
RETICS/RBC NFR AUTO: 1.24 % (ref 0.5–2)
WBC NRBC COR # BLD: 3.83 10*3/MM3 (ref 4.5–12.5)

## 2019-01-22 PROCEDURE — 82525 ASSAY OF COPPER: CPT | Performed by: INTERNAL MEDICINE

## 2019-01-22 PROCEDURE — 85025 COMPLETE CBC W/AUTO DIFF WBC: CPT | Performed by: INTERNAL MEDICINE

## 2019-01-22 PROCEDURE — 83010 ASSAY OF HAPTOGLOBIN QUANT: CPT | Performed by: INTERNAL MEDICINE

## 2019-01-22 PROCEDURE — 84630 ASSAY OF ZINC: CPT | Performed by: INTERNAL MEDICINE

## 2019-01-22 PROCEDURE — 99214 OFFICE O/P EST MOD 30 MIN: CPT | Performed by: INTERNAL MEDICINE

## 2019-01-22 PROCEDURE — 85045 AUTOMATED RETICULOCYTE COUNT: CPT | Performed by: INTERNAL MEDICINE

## 2019-01-22 PROCEDURE — 83615 LACTATE (LD) (LDH) ENZYME: CPT | Performed by: INTERNAL MEDICINE

## 2019-01-22 PROCEDURE — 85610 PROTHROMBIN TIME: CPT | Performed by: INTERNAL MEDICINE

## 2019-01-22 PROCEDURE — 85730 THROMBOPLASTIN TIME PARTIAL: CPT | Performed by: INTERNAL MEDICINE

## 2019-01-23 LAB
GLUCOSE BLDC GLUCOMTR-MCNC: 117 MG/DL (ref 70–130)
GLUCOSE BLDC GLUCOMTR-MCNC: 45 MG/DL (ref 70–130)
HAPTOGLOB SERPL-MCNC: 58 MG/DL (ref 34–200)

## 2019-01-24 LAB
COPPER SERPL-MCNC: 86 UG/DL (ref 72–166)
ZINC SERPL-MCNC: 66 UG/DL (ref 56–134)

## 2019-01-25 NOTE — PROGRESS NOTES
Bone Marrow Aspiration / Biopsy    INDICATION:  Thrombocytopenia, macrocytic anemia, leukopenia, serum light chain elevation, splenomegaly      The risks and benefits of this procedure were explained to the patient in clinic today. The patient  expressed informed consent. Sloane Gustafson was placed in the prone position, and the left posterior, superior iliac crest was palpated and marked. The area was prepped and draped in the usual sterile fashion. Local anesthetic with 12 mL 2% Lidocaine was achieved. The bone marrow needle was introduced and a total of about 20 mL of aspirate were collected for slides, flow cytometry and cytogenetics. The needle was again reintroduced (as the first attempt did not provide adequate sampling) and an adequate core biopsy of bone marrow tissue was collected for histopathology.The patient tolerated the procedure well with no immediate complications. Blood loss was very  minimal. The needle was withdrawn and pressure was held with good hemostasis achieved. Specimen was sent to the lab. She was advised to lay on her back for at least 20 minutes in clinic for further pressure and will also do this at home. She will return to clinic in two weeks to follow up on the pathology results.

## 2019-01-31 ENCOUNTER — PROCEDURE VISIT (OUTPATIENT)
Dept: ONCOLOGY | Facility: HOSPITAL | Age: 59
End: 2019-01-31

## 2019-01-31 ENCOUNTER — PROCEDURE VISIT (OUTPATIENT)
Dept: ONCOLOGY | Facility: CLINIC | Age: 59
End: 2019-01-31

## 2019-01-31 VITALS
OXYGEN SATURATION: 100 % | DIASTOLIC BLOOD PRESSURE: 61 MMHG | RESPIRATION RATE: 18 BRPM | TEMPERATURE: 98.2 F | SYSTOLIC BLOOD PRESSURE: 159 MMHG | HEART RATE: 75 BPM | WEIGHT: 209 LBS | BODY MASS INDEX: 32.73 KG/M2

## 2019-01-31 DIAGNOSIS — D53.9 MACROCYTIC ANEMIA: Primary | ICD-10-CM

## 2019-01-31 DIAGNOSIS — D69.6 THROMBOCYTOPENIA (HCC): ICD-10-CM

## 2019-01-31 DIAGNOSIS — D72.819 LEUKOPENIA, UNSPECIFIED TYPE: ICD-10-CM

## 2019-01-31 DIAGNOSIS — Z87.898 HX OF SPLENOMEGALY: ICD-10-CM

## 2019-01-31 LAB
BASOPHILS # BLD AUTO: 0.02 10*3/MM3 (ref 0–0.3)
BASOPHILS NFR BLD AUTO: 0.5 % (ref 0–2)
DEPRECATED RDW RBC AUTO: 48.7 FL (ref 37–54)
EOSINOPHIL # BLD AUTO: 0.18 10*3/MM3 (ref 0–0.7)
EOSINOPHIL NFR BLD AUTO: 4.6 % (ref 0–5)
ERYTHROCYTE [DISTWIDTH] IN BLOOD BY AUTOMATED COUNT: 14.1 % (ref 11.5–14.5)
HCT VFR BLD AUTO: 31.5 % (ref 37–47)
HGB BLD-MCNC: 10 G/DL (ref 12–16)
IMM GRANULOCYTES # BLD AUTO: 0.01 10*3/MM3 (ref 0–0.03)
IMM GRANULOCYTES NFR BLD AUTO: 0.3 % (ref 0–0.5)
LYMPHOCYTES # BLD AUTO: 0.73 10*3/MM3 (ref 1–3)
LYMPHOCYTES NFR BLD AUTO: 18.8 % (ref 21–51)
MCH RBC QN AUTO: 31.8 PG (ref 27–33)
MCHC RBC AUTO-ENTMCNC: 31.7 G/DL (ref 33–37)
MCV RBC AUTO: 100.3 FL (ref 80–94)
MONOCYTES # BLD AUTO: 0.34 10*3/MM3 (ref 0.1–0.9)
MONOCYTES NFR BLD AUTO: 8.7 % (ref 0–10)
NEUTROPHILS # BLD AUTO: 2.61 10*3/MM3 (ref 1.4–6.5)
NEUTROPHILS NFR BLD AUTO: 67.1 % (ref 30–70)
PLATELET # BLD AUTO: 91 10*3/MM3 (ref 130–400)
PMV BLD AUTO: 10.7 FL (ref 6–10)
RBC # BLD AUTO: 3.14 10*6/MM3 (ref 4.2–5.4)
WBC NRBC COR # BLD: 3.89 10*3/MM3 (ref 4.5–12.5)

## 2019-01-31 PROCEDURE — 38222 DX BONE MARROW BX & ASPIR: CPT | Performed by: INTERNAL MEDICINE

## 2019-01-31 PROCEDURE — 85025 COMPLETE CBC W/AUTO DIFF WBC: CPT

## 2019-01-31 RX ORDER — LIDOCAINE HYDROCHLORIDE 20 MG/ML
INJECTION, SOLUTION EPIDURAL; INFILTRATION; INTRACAUDAL; PERINEURAL
Status: COMPLETED
Start: 2019-01-31 | End: 2019-01-31

## 2019-01-31 RX ORDER — LIDOCAINE HYDROCHLORIDE 20 MG/ML
20 INJECTION, SOLUTION EPIDURAL; INFILTRATION; INTRACAUDAL; PERINEURAL ONCE
Status: COMPLETED | OUTPATIENT
Start: 2019-01-31 | End: 2019-01-31

## 2019-01-31 RX ADMIN — LIDOCAINE HYDROCHLORIDE 20 ML: 20 INJECTION, SOLUTION EPIDURAL; INFILTRATION; INTRACAUDAL; PERINEURAL at 09:30

## 2019-02-08 LAB
LAB AP CASE REPORT: NORMAL
LAB AP CLINICAL INFORMATION: NORMAL
PATH REPORT.FINAL DX SPEC: NORMAL

## 2019-02-13 ENCOUNTER — DOCUMENTATION (OUTPATIENT)
Dept: ONCOLOGY | Facility: CLINIC | Age: 59
End: 2019-02-13

## 2019-02-13 NOTE — PROGRESS NOTES
Patient had bone marrow biopsy done with Dr. Guerra on 1/31/19 for thrombocytopenia, macrocytic anemia, leukopenia, serum light chain elevation and splenomegaly. Results were reviewed with Dr. Green which showed normocellular marrow with trilineage hematopoiesis along with pancytopenia without significant atypia. Based on findings and recent counts which were stable, per Dr. Green, will have patient follow up with Dr. Guerra in 1 month. I called patient with results today and answered all questions to her satisfaction.     Lorena Reddy, APRN  2/13/19  1000

## 2021-02-25 DIAGNOSIS — Z23 IMMUNIZATION DUE: ICD-10-CM

## 2022-06-16 NOTE — TELEPHONE ENCOUNTER
Can you print RX for Xifaxan? I am working on patient assistance for pt. Thank you.   Prescription approved per [facility] Refill Protocol.    Sonia Banks RN

## 2025-02-25 NOTE — INTERVAL H&P NOTE
H&P reviewed. The patient was examined and there are no changes to the H&P.         "2025       RE: Marsha Mcneill  : 1978   MRN: [id number]      Dear Colleague,    Thank you for referring your patient, Marsha Mcneill, to the [facility] EPILEPSY CARE at [facility]. Please see a copy of my visit note below.    [facility] Epilepsy Care History and Physical       Patient:  Marsha Mcneill  :  1978   Age:  46 year old   Today's Office Visit:  2025  Chief Complaint: New patient consultation for spells of speech arrest    Referring Provider:    Jessica Alfaro MD  [address] [place] [id number]    History of Present Illness:  Ms. Mcneill is a 45 yo right handed (now writing left handed due to triplegia) female with multiple sclerosis, quadriparesis with right hemiplegia and paraplegia, presenting for evaluation of spells of speech arrest.   Ms. Mcneill presents by herself. She states her spells began around 1.5 years ago. She will have speech arrest, where she can hear and see around her, she can move and feels should would be able to perform an action if asked such as pointing to the ceiling, but cannot speak. She will be staring off. She describes these as \"zoning spells,\" though denies altered awareness. When they first began they were a couple of times per week. They last up to five minutes she feels, but in reality, 15 to 20 minutes may have passed. She states she is confused during and after the events. She has to be reminded of what is happening at that time. She has had times where she is unaware of where she is when they are over. This last  spell occurred 3 weeks ago, with the frequency now occurring every 3 weeks. She is not sure if these improved with antiseizure medication, though chart review and the patient described frequency has improved since they began. There has never been any injuries associated with the spells.  They do not occur any specific time of day.   She will have weekly spells " "where she has a brief wave of nausea and will feel like her brain \"short circuits,\" lasting seconds, beginning around the same time as the \"zoning out\" spells. This occurs once per week. There is no altered awareness. People around her do not know these are occurring.   She initially started oxcarbazepine around 8/2023 per the chart, but had a low sodium down to 126 on dosing of 300-300 on 12/14/2023. She was transitioned to Keppra in 12/2023 for this reason.   She denies any side effect on Keppra 750-750.   She denies any abnormal movements, muscle jerking, generalized convulsions, or status epilepticus.    Epilepsy Risk Factors:  Normal birth and delivery. Normal development. Denies febrile convulsions. Denies meningitis, encephalitis, strokes, tumors.  Has history of MS (diagnosed around 2004). She did not require special education classes growing up. No significant head trauma, physical or sexual abuse. No family history of seizures.   Precipitating factors:   NONE  Past Medical History:   Diagnosis Date     Anxiety      Chronic pain      Depression      History of DVT (deep vein thrombosis)      Ileostomy status (H)      Insomnia      MRSA (methicillin resistant staph aureus) culture positive      Multiple scleroses      Neurological disorders      Pressure sore      Urinary tract infection    Triplegia related to multiple sclerosis, neurogenic bladder   Past Surgical History:   Procedure Laterality Date     BLADDER AUGMENTATION  12/7/2012    Procedure: BLADDER AUGMENTATION;  APPENDECTOMY, BLADDER AUGMENTATION, HUMBERTO BLADDER STOMA;  Surgeon: Errol Westbrook MD;  Location: [facility] OR     CYSTOSCOPY, INJECT BOTOX N/A 1/8/2021    Procedure: CYSTOSCOPY, WITH BOTULINUM TOXIN INJECTION;  Surgeon: Errol Westbrook MD;  Location: [facility] OR     ILEOSTOMY  2018     INSERT PUMP BACLOFEN  2009    2 revisions     IRRIGATION AND DEBRIDEMENT DECUBITUS WOUND, COMBINED  09/2019     LAPAROTOMY EXPLORATORY  12/20/2012    " Procedure: LAPAROTOMY EXPLORATORY;  Removal of Inter Abdominal Drain;  Surgeon: Errol Westbrook MD;  Location: [facility] OR     LAPAROTOMY EXPLORATORY      ileostomy fro diversion of obstipation     MITROFANOFF PROCEDURE (APPENDIX CONDUIT)  12/7/2012    Procedure: MITROFANOFF PROCEDURE (APPENDIX CONDUIT);;  Surgeon: Errol Westbrook MD;  Location: [facility] OR     SALPINGECTOMY Bilateral 2/26/2020    Procedure: Laparotomy, removal of pelvic mass, both fallopian tubes,;  Surgeon: Tommie Barron MD;  Location: [facility] OR   Wound debridement and flap surgery 11/2024   Family History   Problem Relation Age of Onset     Cancer Mother         non hodgkin's lymphoma      Diabetes Mother      Diabetes Maternal Grandfather      Cardiovascular Maternal Grandfather      Cardiovascular Father    No family history of seizures   Social History     Socioeconomic History     Marital status: Single   Tobacco Use     Smoking status: Never     Smokeless tobacco: Never   Substance and Sexual Activity     Alcohol use: No     Drug use: No     Social Drivers of Health     Financial Resource Strain: Medium Risk (11/24/2024)    [facility]    Overall Financial Resource Strain (CARDIA)      Difficulty of Paying Living Expenses: Somewhat hard   Food Insecurity: No Food Insecurity (11/24/2024)    Received from [employer]    Hunger Vital Sign      Worried About Running Out of Food in the Last Year: Never true      Ran Out of Food in the Last Year: Never true   Transportation Needs: No Transportation Needs (11/24/2024)    [facility]    PRAPARE - Transportation      Lack of Transportation (Medical): No      Lack of Transportation (Non-Medical): No   Physical Activity: Patient Declined (11/24/2024)    [facility]    Exercise Vital Sign      Days of Exercise per Week: Patient declined      Minutes of Exercise per  Session: Patient declined   Stress: Stress Concern Present (11/24/2024)    Received from [employer] [facility]    [place] [employer] - Occupational Stress Questionnaire      Feeling of Stress : To some extent   Social Connections: Unknown (11/24/2024)    Received from [facility] [facility]    Social Connection and Isolation Panel [NHANES]      Frequency of Communication with Friends and Family: Three times a week      Frequency of Social Gatherings with Friends and Family: Once a week      Attends Druze Services: Patient declined      Active Member of Clubs or Organizations: No      Attends Club or Organization Meetings: Never      Marital Status: Living with partner   Interpersonal Safety: Not At Risk (9/27/2024)    [facility]    Intimate Partner Violence      Are you in a relationship where you are physically hurt, threatened and/or made to feel afraid?: No   Housing Stability: Low Risk  (11/24/2024)    [facility] [employer] [facility]    Housing Stability Vital Sign      Unable to Pay for Housing in the Last Year: No      Number of Times Moved in the Last Year: 0      Homeless in the Last Year: No      Employment/School:  Highest level of education is a bachelors in journalism and communications. She works as an , with two hours of work per month.   She was born in [place], [place] and grew up in [place], [place]. She splits time between [place] and [place]. She lives with her . No children.   Alcohol/tobacco/illicit substances: Denies all.   Driving:  Currently patient is:  Not driving.  Female:   Reproductive History: No pregnancies  Hx of Salpinectomy. Has an IUD for 10+ years.     Previous Evaluations for Epilepsy:   EEG awake and asleep 4/4/2023: IMPRESSION: Normal in wakefulness and sleep. No epileptiform discharges or seizures.   3 hour video EEG 2/25/2025: Pending  MRI of Brain wwo 5/13/2024  [facility]:   FINDINGS:   INTRACRANIAL CONTENTS: No acute or subacute infarct. No mass, acute   hemorrhage, or extra-axial fluid collections. Stable small scattered   foci of increased FLAIR signal in the periventricular white matter,   left basal ganglia, right ventral viv, and corpus callosum. No new   focus of signal abnormality identified. Normal ventricles and sulci.   Normal position of the cerebellar tonsils. No pathologic contrast   enhancement.   SELLA: No abnormality accounting for technique.   OSSEOUS STRUCTURES/SOFT TISSUES: Normal marrow signal. The major   intracranial vascular flow voids are maintained.   ORBITS: No abnormality accounting for technique.   SINUSES/MASTOIDS: No paranasal sinus mucosal disease. No middle ear   or mastoid effusion.   Other: Less well evaluated is likely signal abnormality in the upper   cervical cord with probable atrophy, unchanged.     IMPRESSION:   1. Unchanged burden of demyelinating plaques consistent with the   patient's diagnosis of multiple sclerosis.   2.  No enhancing plaques or other findings to suggest active   demyelination.   3. No acute intracranial process.     MRI cervical spine wwo 9/1/2022 [facility]:  FINDINGS:   Satisfactory vertebral body height and alignment. Mild cervical   thoracic curve as before. Redemonstration of multifocal areas of very   subtle abnormal nonenhancing intramedullary T2 hyperintense signal   abnormality within the cervical cord and at the medullary level   series 5 image 8-11. These are stable in size, signal/enhancement   characteristics and distribution overall from 12/12/2019 and remains   compatible with patient's provided clinical diagnosis of   demyelination/MS. The lack of corresponding enhancement suggests   these represent nonactive phase plaques of demyelination with no new   or progressive disease evident. No mass or adenopathy noted within   the neck soft tissues. Satisfactory appearance to the thyroid. No    pathologic enhancement is present. No acute process in the posterior   fossa with full description on current brain MRI 09/01/2022 provided   separately. Satisfactory cervical prevertebral soft tissues. Airways   patent. No evidence for a marrow-infiltrative process. Degenerative   spondylosis at mid cervical levels as below.     Craniovertebral junction and C1-C2: Degenerative changes without   canal stenosis.   C2-C3: Mild interspace narrowing and disc desiccation without disc   herniation. No spinal canal compromise or foraminal narrowing with   mild facet joint arthropathy bilaterally as before.   C3-C4: Mild loss of disc height with annular bulge. No disc   herniation. No spinal stenosis or foraminal compromise with left   facet joint joint arthropathy.   C4-C5: Mild loss of disc height with generalized disc bulge   asymmetric to the left. Superimposed broad-based left foraminal disc   protrusion with osteophytes results in severe left foraminal   compromise and moderate spinal stenosis. No right foraminal   narrowing. Mild facet joint arthropathy is present. Stable appearance   from previous.   C5-C6: Mild loss of disc height with annular bulge. Mild to moderate   bilateral foraminal narrowing left more than right. No disc   herniation. Mild spinal stenosis. Facet joints are normal. Stable   appearance from prior.   C6-C7: Disc desiccation and annular bulge. No disc herniation. Mild   foraminal narrowing bilaterally without spinal stenosis. Left facet   joint arthropathy. No change from previous.   C7-T1: Normal disc height. No herniation. Normal facets. No spinal   canal or neural foraminal stenosis.     IMPRESSION:   1. Overall stable appearance from 12/12/2019 as above.   2.  Multifocal areas of nonspecific nonenhancing T2/IR hyperintensity   abnormality involving the cervical, upper thoracic cord, and at the   medullary level remains compatible with patient's provided clinical   diagnosis of  demyelination/MS.   3.  No significant change in the size, number, distribution or   signal/enhancement characteristics of these lesions from previous   evaluation. Nothing to suggest progressive disease/demyelination or   active phase lesions.   4.  Lesions on today's examination should represent stable and   nonactive phase/quiescent plaques of demyelination.   5.  Stable degenerative changes elsewhere the cervical spine as above   most marked at C4-C5 where there is severe left foraminal compromise   and may correlate to left C5 radicular symptoms.     Review of Systems:  Lethargy / Tiredness:  No  Nausea / Vomiting:  No  Double Vision:  Endorses within the last 4-5 years, with last eye exam 3 years ago, planning to have this updated.   Sleepiness:  No  Depression:  depression and anxiety are under control  Slowed Cognitive Function:  Yes  Memory Problems:  Yes - working with Dr. Hall, scheduled for neuropsychometric testing.  Poor Balance:  Uses a [place]  Dizziness:  No  Appetite Changes:  No  Blurred Vision:  No  Skin: negative  Respiratory: No shortness of breath  Cardiovascular: negative  Have you experienced a traumatic fall related to your events: No  Are these falls related to your seizures: No    Current Outpatient Medications   Medication Sig Dispense Refill     baclofen (GABLOFEN) 06108 MCG/20ML injection As of February 3, 2020   Drug: Baclofen 2000 mcg/mL   Rate (simple continuous rate): 459.5 mcg/day   Low reservoir alarm date: 4/16/20  Managed by Dr. Martinez       baclofen (LIORESAL) 10 MG tablet Take 10 mg by mouth as needed        diazepam (VALIUM) 5 MG tablet Take 1 tablet (5 mg) by mouth daily as needed for muscle spasms. 15 tablet 1     DOMPERIDONE 10 MG TAB/CAP Take by mouth 3 times daily.       FLUoxetine (PROZAC) 40 MG capsule Take 1 capsule (40 mg) by mouth daily 30 capsule 11     FORTEO 600 MCG/2.4ML SOPN injection INJECT 20 MCG (0.08ML) SUBCUTANEOUSLY DAILY       ibuprofen (ADVIL/MOTRIN)  "600 MG tablet Take 1 tablet (600 mg) by mouth every 6 hours as needed for moderate pain 12 tablet 0     levETIRAcetam (KEPPRA) 750 MG tablet Take 1 tablet (750 mg) by mouth 2 times daily. 60 tablet 5     lidocaine (XYLOCAINE) 5 % external ointment APPLY TOPICALLY TO AFFECTED AREA(S) THREE TIMES DAILY AS NEEDED FOR PAIN.       nystatin (MYCOSTATIN) 661906 UNIT/GM external cream 2 times daily as needed        oxybutynin ER (DITROPAN XL) 15 MG 24 hr tablet Take 2 tablets (30 mg) by mouth daily 60 tablet 3     sodium chloride 0.9%, bottle, 0.9 % irrigation        traZODone (DESYREL) 100 MG tablet TAKE 1 TAB BY MOUTH AT BEDTIME  99     calcium carbonate (TUMS) 500 MG chewable tablet Take 1-2 chew tab by mouth daily as needed for heartburn       diazepam (VALIUM) 5 MG tablet Take 1-2 tablets 30 minutes before MRI, 3rd tablet if needed. No driving for 8 hours after taking. 3 tablet 0     diazepam (VALIUM) 5 MG tablet Take 1-2 tablets 30 minutes before MRI, 3rd tablet if needed. No driving for 8 hours after taking. 3 tablet 0     levonorgestrel (MIRENA) 20 MCG/24HR IUD 1 Intra Uterine Device by Intrauterine route         Perceived AED Side Effects: No    Medication Notes: dosing is around 3PM and 1AM  AED Medication Compliance:  compliant most of the time  Using a pill box:  No - she has a system she uses    Past AEDs:      2/25/2025    12:36 PM   AED - ANTIEPILEPTIC DRUGS   levETIRAcetam 750 mg BID PO          Medication marked as long-term   Oxcarbazepine 300-300 caused hyponatremia    Exam:    BP (!) 80/58   Pulse 80   Temp 97.5  F (36.4  C) (Temporal)   Ht 5' 7\" (170.2 cm)   Wt 141 lb (64 kg)   SpO2 98%   BMI 22.08 kg/m       Wt Readings from Last 5 Encounters:   02/25/25 141 lb (64 kg)   01/08/21 170 lb (77.1 kg)   11/04/20 166 lb (75.3 kg)   02/28/20 153 lb 1.6 oz (69.4 kg)   02/03/20 155 lb (70.3 kg)     EXAM:   Vitals:    02/25/25 1142   BP: (!) 80/58   Pulse: 80   Temp: 97.5  F (36.4  C)   TempSrc: Temporal " "  SpO2: 98%   Weight: 141 lb (64 kg)   Height: 5' 7\" (170.2 cm)     General: General examination reveals a female in no acute distress  Cardiovascular: RRR, no rubs, gallops, or murmurs. No carotid bruits heard bilaterally  Pulmonary: Clear to auscultation bilaterally. No adventitious breath sounds.    Neurological Examination:    Mental Status: Alert and awake. Mood and affect were appropriate to situation. Memory appeared intact, not formally tested. Language without dysarthria.  Cranial Nerves:  II, III, IV, VI: Undilated fundoscopic exam not completed. Visual fields full to confrontation. PERRL. EOMI without nystagmus.  V: Normal facial sensation and strength of muscles of mastication.  VII: Facial movements are symmetric and without weakness.  VIII: Hearing equal to finger rub bilaterally.  IX/X: Palate elevation symmetric.  XI: Sternocleidomastoid and trapezius are normal and without weakness.  XII: Tongue is midline.  Musculoskeletal: Neck supple. Head laterally resting to the left.  Motor: Tone spastic in R>L upper extremities and decreased in bilateral lower extremities. Strength 5/5 in left shoulder abduction, 4+/5 with elbow flexion and extension, 4-/5 with finger abduction and  strength. Pronator drift is negative on left.  Reflexes:   Trace in left upper and absent in right upper and bilateral lower extremities.   Sensation: Sensation to vibration is intact in upper and lower extremities, slightly decreased on the left compared to the right lower extremity.   Coordination: Unable to fully extend left arm, able to touch nose. Unable to fully extend fingers. Index finger to thumb tap slowed but intact.   Station and Gait: Presents in a power wheelchair, does not ambulate/transfer.     Assessment and Plan:   Ms. Mcneill has history of multiple sclerosis, quadriparesis with right hemiplegia and paraplegia, presenting for evaluation of recurrent spells of speech arrest. She reports the ability to see, " hear, and move her body, but not speak during these events. She also reports they may last longer than she is aware, indicating she may lose awareness during part of the event. She also has recurrent spells of nausea that last seconds with an associated odd sensation with maintained awareness. Since her events began and antiseizure medications were started, frequency has decreased. She is tolerating Keppra 750-750 without side effects. Routine EEG was normal. 3 hour video EEG from today is pending. The question was raised whether she would benefit from prolonged EEG monitoring for characterization of her spells.    She reports no spells during her EEG today. We reviewed the possibility of prolonged EEG monitoring for her events for characterization provided her interictal EEG is normal. We discussed the pros and cons of both inpatient video EEG monitoring and stratus ambulatory EEG monitoring as she lives out of town. Given the frequency of her events currently, inpatient video EEG monitoring may be more beneficial as the Keppra could be weaned to potentially precipitate an event, which we would not do with at home monitoring. She is interested in prolonged monitoring if appropriate based on the results of today's EEG to confirm whether ongoing AED therapy is necessary, or whether Keppra should be further optimized.     She does not drive, requires a demetrio lift for transfers, and fortunately has not suffered any injuries from her current events. It is possible if she is having more frequent events than she is aware or potentially subclinical events that they could be contributing to her cognitive concerns.    At this time she will remain on her current dosing of Keppra unchanged. Refills are currently up to date. An order for a Keppra level has been placed, which she will do closer to home as this needs to be completed through her port.     She will continue with Dr. Hall for management of her multiple sclerosis.      I will reach out to Ms. Mcneill once her EEG results from today are available to determine the next steps. She was advised to contact the clinic sooner with questions, concerns, or worsening symptoms.    Thank you for letting me participate in your patient's care.      I spent 72 minutes in total today to provide comprehensive medical care; Face to face time: 48 minutes  I spent 13 minutes writing the note and placing orders.   I spent 11 minutes reviewing the chart.     The rest of the time was spent with the patient in face to face interview. During this time key medical decisions were made with review of medical chart prior to visit, visit with patient, counseling/education, and post visit work, including documentation of note on the day of visit. I addressed all questions the patient/caregiver raised in regards to epilepsy or related medical questions.              Again, thank you for allowing me to participate in the care of your patient.        Sincerely,    Sherry Watson PA-C

## (undated) DEVICE — SYR LUERLOK 30CC

## (undated) DEVICE — FRCP BX RADJAW4 NDL 2.8 240CM LG OG BX40

## (undated) DEVICE — SNAR POLYP CAPTIFLX MICRO OVL 13MM 240CM

## (undated) DEVICE — Device

## (undated) DEVICE — GOWN,REINF,POLY,ECL,PP SLV,XL: Brand: MEDLINE

## (undated) DEVICE — SINGLE PORT MANIFOLD: Brand: NEPTUNE 2

## (undated) DEVICE — Device: Brand: DEFENDO AIR/WATER/SUCTION AND BIOPSY VALVE

## (undated) DEVICE — THE BITE BLOCK MAXI, LATEX FREE STRAP IS USED TO PROTECT THE ENDOSCOPE INSERTION TUBE FROM BEING BITTEN BY THE PATIENT.

## (undated) DEVICE — TUBING, SUCTION, 1/4" X 20', STRAIGHT: Brand: MEDLINE INDUSTRIES, INC.